# Patient Record
Sex: FEMALE | Race: WHITE | NOT HISPANIC OR LATINO | Employment: STUDENT | URBAN - METROPOLITAN AREA
[De-identification: names, ages, dates, MRNs, and addresses within clinical notes are randomized per-mention and may not be internally consistent; named-entity substitution may affect disease eponyms.]

---
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
## 2021-10-29 ENCOUNTER — OFFICE VISIT (OUTPATIENT)
Dept: URGENT CARE | Facility: CLINIC | Age: 21
End: 2021-10-29
Payer: COMMERCIAL

## 2021-10-29 VITALS — TEMPERATURE: 98.8 F | HEART RATE: 110 BPM | OXYGEN SATURATION: 98 % | RESPIRATION RATE: 16 BRPM

## 2021-10-29 DIAGNOSIS — J02.0 STREP PHARYNGITIS: Primary | ICD-10-CM

## 2021-10-29 LAB — S PYO AG THROAT QL: NEGATIVE

## 2021-10-29 PROCEDURE — G0382 LEV 3 HOSP TYPE B ED VISIT: HCPCS | Performed by: PHYSICIAN ASSISTANT

## 2021-10-29 PROCEDURE — 87880 STREP A ASSAY W/OPTIC: CPT | Performed by: PHYSICIAN ASSISTANT

## 2021-10-29 PROCEDURE — U0003 INFECTIOUS AGENT DETECTION BY NUCLEIC ACID (DNA OR RNA); SEVERE ACUTE RESPIRATORY SYNDROME CORONAVIRUS 2 (SARS-COV-2) (CORONAVIRUS DISEASE [COVID-19]), AMPLIFIED PROBE TECHNIQUE, MAKING USE OF HIGH THROUGHPUT TECHNOLOGIES AS DESCRIBED BY CMS-2020-01-R: HCPCS | Performed by: PHYSICIAN ASSISTANT

## 2021-10-29 PROCEDURE — U0005 INFEC AGEN DETEC AMPLI PROBE: HCPCS | Performed by: PHYSICIAN ASSISTANT

## 2021-10-29 PROCEDURE — 99283 EMERGENCY DEPT VISIT LOW MDM: CPT | Performed by: PHYSICIAN ASSISTANT

## 2021-10-29 RX ORDER — AZITHROMYCIN 250 MG/1
TABLET, FILM COATED ORAL
Qty: 6 TABLET | Refills: 0 | Status: SHIPPED | OUTPATIENT
Start: 2021-10-29 | End: 2021-11-02

## 2021-10-30 LAB — SARS-COV-2 RNA RESP QL NAA+PROBE: NEGATIVE

## 2021-11-03 LAB — B-HEM STREP SPEC QL CULT: NEGATIVE

## 2022-08-18 ENCOUNTER — OFFICE VISIT (OUTPATIENT)
Dept: URGENT CARE | Facility: CLINIC | Age: 22
End: 2022-08-18
Payer: COMMERCIAL

## 2022-08-18 VITALS — TEMPERATURE: 99.7 F | OXYGEN SATURATION: 99 % | RESPIRATION RATE: 18 BRPM | HEART RATE: 84 BPM

## 2022-08-18 DIAGNOSIS — N30.00 ACUTE CYSTITIS WITHOUT HEMATURIA: Primary | ICD-10-CM

## 2022-08-18 DIAGNOSIS — R30.0 DYSURIA: ICD-10-CM

## 2022-08-18 LAB
SL AMB  POCT GLUCOSE, UA: NEGATIVE
SL AMB LEUKOCYTE ESTERASE,UA: ABNORMAL
SL AMB POCT BILIRUBIN,UA: NEGATIVE
SL AMB POCT BLOOD,UA: NEGATIVE
SL AMB POCT CLARITY,UA: CLEAR
SL AMB POCT COLOR,UA: YELLOW
SL AMB POCT KETONES,UA: NEGATIVE
SL AMB POCT NITRITE,UA: NEGATIVE
SL AMB POCT PH,UA: 5
SL AMB POCT SPECIFIC GRAVITY,UA: 1.01
SL AMB POCT URINE PROTEIN: NEGATIVE
SL AMB POCT UROBILINOGEN: 0.2

## 2022-08-18 PROCEDURE — 87086 URINE CULTURE/COLONY COUNT: CPT | Performed by: PHYSICIAN ASSISTANT

## 2022-08-18 PROCEDURE — G0382 LEV 3 HOSP TYPE B ED VISIT: HCPCS | Performed by: PHYSICIAN ASSISTANT

## 2022-08-18 RX ORDER — CEPHALEXIN 500 MG/1
500 CAPSULE ORAL EVERY 12 HOURS SCHEDULED
Qty: 14 CAPSULE | Refills: 0 | Status: SHIPPED | OUTPATIENT
Start: 2022-08-18 | End: 2022-08-25

## 2022-08-18 NOTE — PATIENT INSTRUCTIONS
Urinary Tract Infection in Women   WHAT YOU NEED TO KNOW:   A urinary tract infection (UTI) is caused by bacteria that get inside your urinary tract  Most bacteria that enter your urinary tract come out when you urinate  If the bacteria stay in your urinary tract, you may get an infection  Your urinary tract includes your kidneys, ureters, bladder, and urethra  Urine is made in your kidneys, and it flows from the ureters to the bladder  Urine leaves the bladder through the urethra  A UTI is more common in your lower urinary tract, which includes your bladder and urethra  DISCHARGE INSTRUCTIONS:   Return to the emergency department if:   You are urinating very little or not at all  You have a high fever with shaking chills  You have side or back pain that gets worse  Call your doctor if:   You have a fever  You do not feel better after 2 days of taking antibiotics  You are vomiting  You have questions or concerns about your condition or care  Medicines:   Antibiotics  help fight a bacterial infection  If you have UTIs often (called recurrent UTIs), you may be given antibiotics to take regularly  You will be given directions for when and how to use antibiotics  The goal is to prevent UTIs but not cause antibiotic resistance by using antibiotics too often  Medicines  may be given to decrease pain and burning when you urinate  They will also help decrease the feeling that you need to urinate often  These medicines will make your urine orange or red  Take your medicine as directed  Contact your healthcare provider if you think your medicine is not helping or if you have side effects  Tell him or her if you are allergic to any medicine  Keep a list of the medicines, vitamins, and herbs you take  Include the amounts, and when and why you take them  Bring the list or the pill bottles to follow-up visits  Carry your medicine list with you in case of an emergency      Follow up with your doctor as directed:  Write down your questions so you remember to ask them during your visits  Prevent another UTI:   Empty your bladder often  Urinate and empty your bladder as soon as you feel the need  Do not hold your urine for long periods of time  Wipe from front to back after you urinate or have a bowel movement  This will help prevent germs from getting into your urinary tract through your urethra  Drink liquids as directed  Ask how much liquid to drink each day and which liquids are best for you  You may need to drink more liquids than usual to help flush out the bacteria  Do not drink alcohol, caffeine, or citrus juices  These can irritate your bladder and increase your symptoms  Your healthcare provider may recommend cranberry juice to help prevent a UTI  Urinate after you have sex  This can help flush out bacteria passed during sex  Do not douche or use feminine deodorants  These can change the chemical balance in your vagina  Change sanitary pads or tampons often  This will help prevent germs from getting into your urinary tract  Talk to your healthcare provider about your birth control method  You may need to change your method if it is increasing your risk for UTIs  Wear cotton underwear and clothes that are loose  Tight pants and nylon underwear can trap moisture and cause bacteria to grow  Vaginal estrogen may be recommended  This medicine helps prevent UTIs in women who have gone through menopause or are in mario-menopause  Do pelvic muscle exercises often  Pelvic muscle exercises may help you start and stop urinating  Strong pelvic muscles may help you empty your bladder easier  Squeeze these muscles tightly for 5 seconds like you are trying to hold back urine  Then relax for 5 seconds  Gradually work up to squeezing for 10 seconds  Do 3 sets of 15 repetitions a day, or as directed      © Copyright [employer] 2022 Information is for End User's use only and may not be sold, redistributed or otherwise used for commercial purposes  All illustrations and images included in CareNotes® are the copyrighted property of A D A M , Inc  or Anabell Pavon  The above information is an  only  It is not intended as medical advice for individual conditions or treatments  Talk to your doctor, nurse or pharmacist before following any medical regimen to see if it is safe and effective for you

## 2022-08-20 LAB — BACTERIA UR CULT: NORMAL

## 2022-08-29 NOTE — PROGRESS NOTES
[facility]        NAME: Meng Osorio is a 22 y o  female  : 2000    MRN: [id number]  DATE: 2022  TIME: 6:47 AM    Assessment and Plan   Acute cystitis without hematuria [N30 00]  1  Acute cystitis without hematuria  cephalexin (KEFLEX) 500 mg capsule   2  Dysuria  POCT urine dip auto non-scope    Urine culture         Patient Instructions       Follow up with PCP in 3-5 days  Proceed to  ER if symptoms worsen  Chief Complaint     Chief Complaint   Patient presents with    Possible UTI     Began yesterday         History of Present Illness       81-year-old female presents the clinic with dysuria, frequency, urgency that started yesterday  Patient denies fevers, chills, nausea, vomiting, headaches, dizziness, lightheadedness, flank pain, back pain, hematuria  Review of Systems   Review of Systems   Constitutional: Negative for chills and fever  Respiratory: Negative for shortness of breath  Cardiovascular: Negative for chest pain and palpitations  Gastrointestinal: Negative for abdominal pain, constipation, diarrhea, nausea and vomiting  Genitourinary: Positive for dysuria, frequency and urgency  Negative for flank pain and hematuria  Musculoskeletal: Negative for back pain  Neurological: Negative for dizziness, syncope, weakness, light-headedness and headaches  All other systems reviewed and are negative  Current Medications     No current outpatient medications on file  Current Allergies     Allergies as of 2022 - Reviewed 2022   Allergen Reaction Noted    Prednisone Chest Pain 10/29/2021            The following portions of the patient's history were reviewed and updated as appropriate: allergies, current medications, past family history, past medical history, past social history, past surgical history and problem list      Past Medical History:   Diagnosis Date    Strep throat        History reviewed   No pertinent surgical history  History reviewed  No pertinent family history  Medications have been verified  Objective   Pulse 84   Temp 99 7 °F (37 6 °C)   Resp 18   SpO2 99%   No LMP recorded  Physical Exam     Physical Exam  Vitals and nursing note reviewed  Constitutional:       General: She is not in acute distress  Appearance: She is well-developed  She is not diaphoretic  Pulmonary:      Effort: Pulmonary effort is normal    Abdominal:      General: Bowel sounds are normal       Palpations: Abdomen is soft  Tenderness: There is no abdominal tenderness  There is no right CVA tenderness, left CVA tenderness or guarding  Skin:     General: Skin is warm and dry  Neurological:      Mental Status: She is alert and oriented to person, place, and time

## 2022-10-12 ENCOUNTER — OFFICE VISIT (OUTPATIENT)
Dept: URGENT CARE | Facility: CLINIC | Age: 22
End: 2022-10-12
Payer: COMMERCIAL

## 2022-10-12 VITALS
HEIGHT: 62 IN | BODY MASS INDEX: 20.24 KG/M2 | RESPIRATION RATE: 18 BRPM | DIASTOLIC BLOOD PRESSURE: 62 MMHG | TEMPERATURE: 99.5 F | HEART RATE: 81 BPM | WEIGHT: 110 LBS | OXYGEN SATURATION: 100 % | SYSTOLIC BLOOD PRESSURE: 110 MMHG

## 2022-10-12 DIAGNOSIS — H69.93 DISORDER OF BOTH EUSTACHIAN TUBES: Primary | ICD-10-CM

## 2022-10-12 PROCEDURE — G0382 LEV 3 HOSP TYPE B ED VISIT: HCPCS | Performed by: PHYSICIAN ASSISTANT

## 2022-10-12 RX ORDER — FLUTICASONE PROPIONATE 50 MCG
1 SPRAY, SUSPENSION (ML) NASAL DAILY
Qty: 9.9 ML | Refills: 0 | Status: SHIPPED | OUTPATIENT
Start: 2022-10-12

## 2022-10-12 NOTE — PATIENT INSTRUCTIONS
Eustachian Tube Dysfunction   AMBULATORY CARE:   Eustachian tube dysfunction (ETD)  is a condition that prevents your eustachian tubes from opening properly  It can also cause them to become blocked  Eustachian tubes connect your middle ear to the back of your nose and throat  These tubes open and allow air to flow in and out when you sneeze, swallow, or yawn  Common signs and symptoms include the following:   Fullness or pressure in your ears    Muffled hearing, or a feeling you are hearing under water or have clogged ears    Pain in one or both ears    Ringing in your ears    Popping, crackling, or clicking feeling in your ears    Trouble keeping your balance    Call your doctor or otolaryngologist if:   Your symptoms do not improve or get worse  You have a fever  You have any hearing loss  You have questions or concerns about your condition or care  Treatment:  ETD may get better on its own without any treatment  If it continues, you may need any of the following:  Swallow, yawn, or chew gum  to help open your eustachian tubes  Your healthcare provider may also recommend you blow with your mouth shut and your nostrils pinched closed  Air pressure devices  push air into your nose and eustachian tubes to help relieve air pressure in your ear  Treatment for allergies  such as decongestants, antihistamines, and nasal steroids may improve ETD  They may help decrease swelling of the eustachian tubes  A myringotomy  is surgery to make a hole in your eardrum  The hole relieves pressure and lets fluid drain from your ear  A pressure equalizing (PE) tube may be used to keep the hole open and to help drain fluid  Tuboplasty  is a procedure to widen your eustachian tubes  Follow up with your doctor or otolaryngologist as directed:  Write down your questions so you remember to ask them during your visits    © Copyright [employer] 2022 Information is for End User's use only and may not be sold, redistributed or otherwise used for commercial purposes  All illustrations and images included in CareNotes® are the copyrighted property of [employer] M [employer]  or [employer]ab[employer] Pav[employer]  The above information is an  only  It is not intended as medical advice for individual conditions or treatments  Talk to your doctor, nurse or pharmacist before following any medical regimen to see if it is safe and effective for you

## 2022-11-01 ENCOUNTER — TELEPHONE (OUTPATIENT)
Dept: PSYCHIATRY | Facility: CLINIC | Age: 22
End: 2022-11-01

## 2022-11-01 NOTE — TELEPHONE ENCOUNTER
Clt is calling for counseling, looking for virtual apts  Clt would like to be seen due to life stressors and anxiety issues

## 2022-11-03 ENCOUNTER — TELEMEDICINE (OUTPATIENT)
Dept: BEHAVIORAL/MENTAL HEALTH CLINIC | Facility: CLINIC | Age: 22
End: 2022-11-03

## 2022-11-03 DIAGNOSIS — F41.9 ANXIETY DISORDER, UNSPECIFIED TYPE: Primary | ICD-10-CM

## 2022-11-03 NOTE — PSYCH
Psychotherapy Provided: Individual Psychotherapy 45 minutes     Length of time in session: 45 minutes, follow up in two weeks    Encounter Diagnosis     ICD-10-CM    1  Anxiety disorder, unspecified type  F41 9        Goals addressed in session: Goal 1     Pain:      moderate to severe    6    Current suicide risk : Low     Data: 1st session with Jaswinder Nguyen (22) who shared about history of anxiety  She works two jobs and only gets 4-6 hours sleep  Therapist emphasized 8-10 hours sleep and taught sleep skills  Psycho-education on anxiety physical symptoms  Reduce unreasonable worry and rumination  Client already does mindfulness activity ex  hiking, shower before bedtime; and therapist explained the concept of mindfulness to lower anxiety  Assessment: No interest in psych meds  No risks assessed  Mild to moderate anxiety  Poor sleep, and sleep improvement may significantly reduce the anxiety symptoms  Good engagement in session  Progress: Treatment plan goals of sleep and anxiety  Action phase  Plan: Homework of client following therapist suggestions  Client will call for next appt  Behavioral Health Treatment Plan [facility]: Diagnosis and Treatment Plan explained to [facility] relates understanding diagnosis and is agreeable to Treatment Plan   Yes     Visit start and stop times:    11/03/22  Start Time: 1255  Stop Time: 1340  Total Visit Time: 45 minutes

## 2022-11-03 NOTE — BH TREATMENT PLAN
Merry Loredo  2000       Date of Initial Treatment Plan: 11/3/22   Date of Current Treatment Plan: 11/03/22    Treatment Plan Number one     Strengths/Personal Resources for Self Care: Desire to get better    Diagnosis:   1  Anxiety disorder, unspecified type         Area of Needs: Improvement in sleep      Long Term Goal 1: A - Reduce anxiety    Target Date: N/A  Completion Date: N/A         Short Term Objectives for Goal 1: A - Use sleep skills to achieve 8-10 hours sleep            B: Practice anxiety skills      GOAL 1: Modality: Individual 2x per month   Completion Date TBD      Behavioral Health Treatment Plan [facility]: Diagnosis and Treatment Plan explained to Halley Scott relates understanding diagnosis and is agreeable to Treatment Plan            Client Comments : Please share your thoughts, feelings, need and/or experiences regarding your treatment plan:

## 2022-11-10 ENCOUNTER — APPOINTMENT (OUTPATIENT)
Dept: RADIOLOGY | Facility: CLINIC | Age: 22
End: 2022-11-10

## 2022-11-10 ENCOUNTER — OFFICE VISIT (OUTPATIENT)
Dept: URGENT CARE | Facility: CLINIC | Age: 22
End: 2022-11-10

## 2022-11-10 VITALS
DIASTOLIC BLOOD PRESSURE: 62 MMHG | OXYGEN SATURATION: 100 % | RESPIRATION RATE: 18 BRPM | TEMPERATURE: 98 F | HEART RATE: 78 BPM | SYSTOLIC BLOOD PRESSURE: 108 MMHG

## 2022-11-10 DIAGNOSIS — R07.89 CHEST WALL PAIN: Primary | ICD-10-CM

## 2022-11-10 DIAGNOSIS — R07.9 CHEST PAIN, UNSPECIFIED TYPE: ICD-10-CM

## 2022-11-10 NOTE — PATIENT INSTRUCTIONS
Chest Wall Pain   WHAT YOU NEED TO KNOW:   Chest wall pain may be caused by problems with the muscles, cartilage, or bones of the chest wall  Chest wall pain may also be caused by pain that spreads to your chest from another part of your body  The pain may be aching, severe, dull, or sharp  It may come and go, or it may be constant  The pain may be worse when you move in certain ways, breathe deeply, or cough  DISCHARGE INSTRUCTIONS:   Call 911 if:   You have any of the following signs of a heart attack:      Squeezing, pressure, or pain in your chest    You may  also have any of the following:     Discomfort or pain in your back, neck, jaw, stomach, or arm    Shortness of breath    Nausea or vomiting    Lightheadedness or a sudden cold sweat      Return to the emergency department if:   You have severe pain  Contact your healthcare provider if:   You develop a rash  You have other new symptoms  Your pain does not improve, even with treatment  You have questions or concerns about your condition or care  Medicines: You may need any of the following:  NSAIDs , such as ibuprofen, help decrease swelling, pain, and fever  This medicine is available with or without a doctor's order  NSAIDs can cause stomach bleeding or kidney problems in certain people  If you take blood thinner medicine, always ask your healthcare provider if NSAIDs are safe for you  Always read the medicine label and follow directions  Acetaminophen  decreases pain  It is available without a doctor's order  Ask how much to take and how often to take it  Follow directions  Acetaminophen can cause liver damage if not taken correctly  A cream  may be applied to your chest to decrease pain  Take your medicine as directed  Contact your healthcare provider if you think your medicine is not helping or if you have side effects  Tell him of her if you are allergic to any medicine   Keep a list of the medicines, vitamins, and herbs you take  Include the amounts, and when and why you take them  Bring the list or the pill bottles to follow-up visits  Carry your medicine list with you in case of an emergency  Follow up with your healthcare provider as directed:  Write down your questions so you remember to ask them during your visits  Self-care:   Rest  as needed  Avoid activities that make your chest wall pain worse  Apply heat  on your chest for 20 to 30 minutes every 2 hours for as many days as directed  Heat helps decrease pain and muscle spasms  Apply ice  on your chest for 15 to 20 minutes every hour or as directed  Use an ice pack, or put crushed ice in a plastic bag  Cover it with a towel  Ice helps prevent tissue damage and decreases swelling and pain  © Copyright [employer] 2022 Information is for End User's use only and may not be sold, redistributed or otherwise used for commercial purposes  All illustrations and images included in CareNotes® are the copyrighted property of A D A M , Inc  or [facility]   The above information is an  only  It is not intended as medical advice for individual conditions or treatments  Talk to your doctor, nurse or pharmacist before following any medical regimen to see if it is safe and effective for you

## 2022-11-11 LAB
ATRIAL RATE: 74 BPM
P AXIS: 27 DEGREES
PR INTERVAL: 116 MS
QRS AXIS: 28 DEGREES
QRSD INTERVAL: 72 MS
QT INTERVAL: 354 MS
QTC INTERVAL: 392 MS
T WAVE AXIS: 17 DEGREES
VENTRICULAR RATE: 74 BPM

## 2022-11-16 NOTE — PROGRESS NOTES
[facility]        NAME: Rigo Palacios is a 25 y o  female  : 2000    MRN: [id number]  DATE: November 10, 2022  TIME: 8:29 AM    Assessment and Plan   Chest wall pain [R07 89]  1  Chest wall pain  XR chest pa & lateral            Patient Instructions     Pt has what I suspect is chest wall pain as it is reproducible on exam and both EKG and CXR performed today are normal  Pt was provided reassurance  I rec conservative treatment trial including NSAIDs with close observation  Should be reevaluated if condition persists/worsens  Follow up with PCP in 3-5 days  Proceed to  ER if symptoms worsen  Chief Complaint     Chief Complaint   Patient presents with   • Pain     Developed today  She reports a heavy pain in the center of her chest  Constant pain that is made worse with deep breathing  History of Present Illness       Pt presents with onset today of chest pain  Reports worse with deep breathing and it is reproducible/tender to the touch  She reports it is constant  It does not radiate and she denies any other significant symptoms  Denies anxiety or any increase in stress  Denies injury/trauma to the chest        Review of Systems   Review of Systems   Constitutional: Negative  Respiratory: Negative  Cardiovascular: Positive for chest pain  Negative for palpitations  Gastrointestinal: Negative  Genitourinary: Negative            Current Medications       Current Outpatient Medications:   •  fluticasone (FLONASE) 50 mcg/act nasal spray, 1 spray into each nostril daily (Patient not taking: Reported on 11/10/2022), Disp: 9 9 mL, Rfl: 0    Current Allergies     Allergies as of 11/10/2022 - Reviewed 11/10/2022   Allergen Reaction Noted   • Prednisone Chest Pain 10/29/2021            The following portions of the patient's history were reviewed and updated as appropriate: allergies, current medications, past family history, past medical history, past social history, past surgical history and problem list      Past Medical History:   Diagnosis Date   • Strep throat        History reviewed  No pertinent surgical history  History reviewed  No pertinent family history  Medications have been verified  Objective   /62   Pulse 78   Temp 98 °F (36 7 °C)   Resp 18   SpO2 100%   No LMP recorded  Physical Exam     Physical Exam  Vitals reviewed  Constitutional:       General: She is not in acute distress  Appearance: She is well-developed and well-nourished  HENT:      Mouth/Throat:      Mouth: Mucous membranes are moist       Pharynx: Oropharynx is clear  Cardiovascular:      Rate and Rhythm: Normal rate and regular rhythm  Pulses: Normal pulses  Heart sounds: Normal heart sounds  No murmur heard  Pulmonary:      Effort: Pulmonary effort is normal  No respiratory distress  Breath sounds: Normal breath sounds  Musculoskeletal:         General: Tenderness (Reproducible TTP over the anterior chest but no swelling, ecchymosis, or deformity) present  No deformity  Cervical back: Neck supple  Right lower leg: No edema  Left lower leg: No edema  Lymphadenopathy:      Cervical: No cervical adenopathy  Neurological:      Mental Status: She is alert and oriented to person, place, and time     Psychiatric:         Mood and Affect: Mood and affect normal

## 2022-11-21 NOTE — PROGRESS NOTES
[facility]        NAME: Yvon Martinez is a 25 y o  female  : 2000    MRN: [id number]  DATE: 2022  TIME: 6:55 AM    Assessment and Plan   Disorder of both eustachian tubes [H69 93]  1  Disorder of both eustachian tubes  fluticasone (FLONASE) 50 mcg/act nasal spray            Patient Instructions       Follow up with PCP in 3-5 days  Proceed to  ER if symptoms worsen  Chief Complaint     Chief Complaint   Patient presents with   • Earache     Pt reports b/l ear pain that began last night  R>L  History of Present Illness       Pt reports b/l ear pain that began last night  R>L  Review of Systems   Review of Systems   Constitutional: Negative for chills and fever  HENT: Positive for ear pain  Negative for congestion, rhinorrhea and sore throat  Respiratory: Negative for cough  Gastrointestinal: Negative for abdominal pain, diarrhea, nausea and vomiting  Musculoskeletal: Negative for myalgias  Neurological: Negative for dizziness, light-headedness and headaches  Current Medications       Current Outpatient Medications:   •  fluticasone (FLONASE) 50 mcg/act nasal spray, 1 spray into each nostril daily (Patient not taking: Reported on 11/10/2022), Disp: 9 9 mL, Rfl: 0    Current Allergies     Allergies as of 10/12/2022 - Reviewed 10/12/2022   Allergen Reaction Noted   • Prednisone Chest Pain 10/29/2021            The following portions of the patient's history were reviewed and updated as appropriate: allergies, current medications, past family history, past medical history, past social history, past surgical history and problem list      Past Medical History:   Diagnosis Date   • Strep throat        History reviewed  No pertinent surgical history  History reviewed  No pertinent family history  Medications have been verified          Objective   /62   Pulse 81   Temp 99 5 °F (37 5 °C)   Resp 18   Ht 5' 2" (1 575 m)   Wt 49 9 kg (110 lb)   LMP 09/21/2022   SpO2 100%   BMI 20 12 kg/m²   Patient's last menstrual period was 09/21/2022  Physical Exam     Physical Exam  Vitals and nursing note reviewed  Constitutional:       General: She is not in acute distress  Appearance: She is well-developed  She is not diaphoretic  HENT:      Head: Normocephalic and atraumatic  Right Ear: A middle ear effusion is present  Tympanic membrane is not erythematous  Left Ear: A middle ear effusion is present  Tympanic membrane is not erythematous  Nose: Nose normal    Eyes:      Conjunctiva/sclera: Conjunctivae normal    Pulmonary:      Effort: Pulmonary effort is normal    Musculoskeletal:         General: Normal range of motion  Cervical back: Normal range of motion and neck supple  Skin:     General: Skin is warm and dry  Findings: No rash  Neurological:      Mental Status: She is alert and oriented to person, place, and time

## 2022-11-30 ENCOUNTER — TELEPHONE (OUTPATIENT)
Dept: PSYCHIATRY | Facility: CLINIC | Age: 22
End: 2022-11-30

## 2022-11-30 NOTE — TELEPHONE ENCOUNTER
Spoke to patient regarding her interest in [facility] from [facility] to a different therapist here  Explained to pt she would have to express her interest in transfer to [facility], having one more appt with him and then being transferred   Transferred call to  for scheduling

## 2022-12-13 ENCOUNTER — TELEMEDICINE (OUTPATIENT)
Dept: BEHAVIORAL/MENTAL HEALTH CLINIC | Facility: CLINIC | Age: 22
End: 2022-12-13

## 2022-12-13 ENCOUNTER — DOCUMENTATION (OUTPATIENT)
Dept: BEHAVIORAL/MENTAL HEALTH CLINIC | Facility: CLINIC | Age: 22
End: 2022-12-13

## 2022-12-13 DIAGNOSIS — F41.9 ANXIETY DISORDER, UNSPECIFIED TYPE: Primary | ICD-10-CM

## 2022-12-13 NOTE — PROGRESS NOTES
8/9/22 intake, sessions 11/3 and 12/13  Anxiety unspecified    Good engagement in therapist's recommendations  Client wants to try different therapists within the agency to get different perspectives  A friend recommended this  Does not need higher level of care  Transfer initiated by current therapist  No other providers  Routed to Mat Ramirez & Pito Rodriguez

## 2022-12-13 NOTE — PSYCH
Psychotherapy Provided: Individual Psychotherapy 30 minutes     Length of time in session: 30 minutes, follow up with new therapist    Encounter Diagnosis     ICD-10-CM    1  Anxiety disorder, unspecified type  F41 9           Goals addressed in session: Goal 1     Pain:      moderate to severe    2    Current suicide risk : Low     Data: Dian (22) reports using mindfulness (reading good books) effectively and has seen a decrease in anxiety  Reports still needing to improve sleep but uses books at bedtime to clear her mind  Discussed are 22 adjustment and transition  Reports no barriers to pursuing new dreams and ambitions  Assessment: No psych meds and does not want medication  No risks assessed  Adjustment anxiety  Good engagement in session  5-7 hours sleep  Daniel Arjun wants to try different therapists to get different perspectives  No complaints about current therapist    Progress: Good engagement in skills to lower anxiety  Action phase  Plan: Per client's request a transfer document will be done  Behavioral Health Treatment Plan [facility]: Diagnosis and Treatment Plan explained to Harrold Ahumada relates understanding diagnosis and is agreeable to Treatment Plan   Yes     Visit start and stop times:    12/13/22  Start Time: 1300  Stop Time: 1330  Total Visit Time: 30 minutes

## 2022-12-15 ENCOUNTER — TELEPHONE (OUTPATIENT)
Dept: PSYCHIATRY | Facility: CLINIC | Age: 22
End: 2022-12-15

## 2022-12-15 NOTE — TELEPHONE ENCOUNTER
rec'd [employer] for transfer of care from [facility] to another provider  LVM for pt to contact intake for scheduling

## 2022-12-28 NOTE — TELEPHONE ENCOUNTER
Pt has been scheduled for transfer of care appt from [facility] to [facility] on 2/9 at 12pm VIRTUAL  Link to be sent to cell

## 2023-02-09 ENCOUNTER — TELEMEDICINE (OUTPATIENT)
Dept: BEHAVIORAL/MENTAL HEALTH CLINIC | Facility: CLINIC | Age: 23
End: 2023-02-09

## 2023-02-09 ENCOUNTER — TELEPHONE (OUTPATIENT)
Dept: PSYCHIATRY | Facility: CLINIC | Age: 23
End: 2023-02-09

## 2023-02-09 DIAGNOSIS — F41.9 ANXIETY DISORDER, UNSPECIFIED TYPE: Primary | ICD-10-CM

## 2023-02-09 NOTE — PSYCH
Virtual Regular Visit    Verification of patient location:    Patient is located in the following state in which I hold an active license [place]      Assessment/Plan:    Problem List Items Addressed This Visit    None      Goals addressed in session: Goal 1 and Goal 2          Reason for visit is   Chief Complaint   Patient presents with   • Virtual Regular Visit        Encounter provider Gilda Cloud, [facility] AND WOMEN'S [facility]    Provider located at [facility] [address] [id number] [facility]  [phone]      Recent Visits  No visits were found meeting these conditions  Showing recent visits within past 7 days and meeting all other requirements  Future Appointments  No visits were found meeting these conditions  Showing future appointments within next 150 days and meeting all other requirements       The patient was identified by name and date of birth  Lynsey Beverly was informed that this is a telemedicine visit and that the visit is being conducted throughthe [employer] platform  She agrees to proceed     My office door was closed  No one else was in the room  She acknowledged consent and understanding of privacy and security of the video platform  The patient has agreed to participate and understands they can discontinue the visit at any time  Patient is aware this is a billable service  Arin Antony is a 25 y o  female presenting for counseling as a transfer from [facility] Psychotherapy Progress Note    Psychotherapy Provided: Individual Psychotherapy     No diagnosis found  Goals addressed in session: Goal 1     DATA:     Transfer from [facility]  Ct  Would like to address anxiety and relationship issues  Reviewed chart and client's history, current status  Ct reports that her mom and dad  when client was in 7th grade  "Mom moved out and got a place  50/50 visitation  Was harsh at times, but not horrible  Divorce in process, may be complete  "Shannan Solis out of love with one another, and had mistrust  Was difficult "  Isiah Lim reports that the kids of that union are client and twin sister Sarah Acuña - Fraternal   Close with her  Father had three children in previous relationship  Lived with two of them  Sugar 30, Donta 27 or 28  Last three years of rx of parents was not as healthy  Father worked a lot, and was not as close with him  Tiptoed around him  Was closer with Mom, now closer with F   F more of a people pleaser  Ct reports on her relationship history: "12th grade, dating Bina Coelho  He is not around anymore  Best friend Anne Le and I friends 10 years  After Bina Coelho and clt broke up, decided to try relationship romantically  Got together awhile, and then broke up in the summer  Took a break to better ourselves and focus on our respective career goals for a bit  Still friends, talk a lot  Started dating again recently  In the meanwhile, re-met another childhood friend, and started rx with [facility]  Took time, but started to date  Like both of them  Feeling some confusion  Hx of close rx's that others would percieve as a romantic relationship  Girl best friend is Analy  I have trouble understanding myself, and put others before me  Mostly confused about what I am feeling "    " want to understand my feelings and how to sort myself out  My primary focus right now is on relationships issues and managing how to be healthy  I identify as female and I prefer men "    "I work as a  with 32 year olds  I am also a manager at [employer]  I moved out on my won about a year ago    My father moved to [place] "    During this session, this clinician used the following therapeutic modalities: Cognitive Behavioral Therapy, Dialectical Behavior Therapy, Mindfulness-based Strategies, Motivational Interviewing and Supportive Psychotherapy     Client said she is comfortable with this therapist so far and would like to RS  Substance Abuse was addressed during this session  If the client is diagnosed with a co-occurring substance use disorder, please indicate any changes in the frequency or amount of use: Ct  Denies any problems with drugs or alcohol  Has an occasional drink, denies street drug use    Stage of change for addressing substance use diagnoses: No substance use/Not applicable  Never smoker  ASSESSMENT:  Espinoza Murguia presents with a Euthymic/ normal mood  her affect is Normal range and intensity, which is congruent, with her mood and the content of the session  The client has made progress on her goals  Honey Mario is not on meds and would prefer to work on things behaviorally  If that does not work will revisit need for meds  "  Espinoza Murguia presents with a none risk of suicide, none risk of self-harm, and none risk of harm to others  For any risk assessment that surpasses a "low" rating, a safety plan must be developed  A safety plan was indicated: no  If yes, describe in detail      PLAN: Between sessions, Espinoza Hsantal will work on above discussed topics and tx goals    At the next session, the therapist will use Cognitive Behavioral Therapy, Dialectical Behavior Therapy, Mindfulness-based Strategies, Motivational Interviewing and Supportive Psychotherapy to address same  RS for 3/9, client put on cancel list for sooner, invited to call if needs support before 3/9  Behavioral Health Treatment Plan and Discharge Planning: Espinozaaaliyah Murguia is aware of and agrees to continue to work on their treatment plan  They have identified and are working toward their discharge goals  yes    Visit start and stop times:    02/18/23  Start Time: 1208  Stop Time: 1300  Total Visit Time: 52 minutes      HPI     Past Medical History:   Diagnosis Date   • Strep throat        No past surgical history on file      Current Outpatient Medications   Medication Sig Dispense Refill   • fluticasone (FLONASE) 50 mcg/act nasal spray 1 spray into each nostril daily (Patient not taking: Reported on 11/10/2022) 9 9 mL 0     No current facility-administered medications for this visit  Allergies   Allergen Reactions   • Prednisone Chest Pain       Review of Systems    Video Exam    There were no vitals filed for this visit      Physical Exam

## 2023-02-18 PROBLEM — F41.9 ANXIETY DISORDER, UNSPECIFIED: Status: ACTIVE | Noted: 2023-02-18

## 2023-03-16 ENCOUNTER — TELEPHONE (OUTPATIENT)
Dept: BEHAVIORAL/MENTAL HEALTH CLINIC | Facility: CLINIC | Age: 23
End: 2023-03-16

## 2023-03-16 NOTE — TELEPHONE ENCOUNTER
Ct  feeling anxious  Sent email today to me reflecting same  I made an outreach call to client  left VM  Responded via email to her original email  "Tushar Biggs, I am sorry to hear you are feeling anxious  I tried to call  Can call me [phone] until 10 am and after 2 pm   We are encouraged not to email clinical information, so I would prefer to talk with you if possible  You could also respond via email and let me know times I can call you  Also, if want some anxiety mgmt  tips along lines of what we have been discussing until we talk, can watch “Therapy in [place] on Youtube  They are short and helpful  Talk with you soon, Giovani Aquino"

## 2023-03-20 ENCOUNTER — TELEMEDICINE (OUTPATIENT)
Dept: BEHAVIORAL/MENTAL HEALTH CLINIC | Facility: CLINIC | Age: 23
End: 2023-03-20

## 2023-03-20 DIAGNOSIS — F41.9 ANXIETY DISORDER, UNSPECIFIED TYPE: Primary | ICD-10-CM

## 2023-03-20 NOTE — PSYCH
Virtual Regular Visit    Verification of patient location:    Patient is located in the following state in which I hold an active license [place]      Assessment/Plan:    Problem List Items Addressed This Visit        Other    Anxiety disorder, unspecified - Primary       Goals addressed in session: Goal 1          Reason for visit is   Chief Complaint   Patient presents with   • Virtual Regular Visit        Encounter provider Ernesto Delgado, [facility] \[facility]\""    Provider located at [address] [place], [place] [id number]  [address] [place] [address]  [phone]      Recent Visits  Date Type Provider Dept   03/16/23 Telephone Ernesto Delgado, [address] [facility]ist [facility]   Showing recent visits within past 7 days and meeting all other requirements  Today's Visits  Date Type Provider Dept   03/20/23 [facility], [address] [facility] Therapist [facility]   Showing today's visits and meeting all other requirements  Future Appointments  No visits were found meeting these conditions  Showing future appointments within next 150 days and meeting all other requirements       The patient was identified by name and date of birth  Shashi Montana was informed that this is a telemedicine visit and that the visit is being conducted throughthe [employer] platform  She agrees to proceed     My office door was closed  No one else was in the room  She acknowledged consent and understanding of privacy and security of the video platform  The patient has agreed to participate and understands they can discontinue the visit at any time  Patient is aware this is a billable service  Eris Diallo is a 25 y o  female presenting for counseling support       Behavioral Health Psychotherapy Progress Note    Psychotherapy Provided: Individual Psychotherapy     1   Anxiety disorder, unspecified type            Goals addressed in session: Goal 1 DATA:     Client discussed healthy growth with Tyrone (24)  "He has been staying with me since Ayala's day  We are communicating well, have a lot in common, he is supportive of me "  Identified an area of challenge: "His mom is not especially happy with us being together  I think she is frustrated that she can't control him  She is unmarried, and does not have a boyfriend  He is wanting to be on his own and not reliant on her  He wants to be an independent adult  He is working as a  but is finding that he wants to do something else, so exploring job options "  We discussed what she wants to heal the rx with his mother and how close ore distant she wants the rx  Ct  Uses healthy calm assertive language  Continue to build coping skills  During this session, this clinician used the following therapeutic modalities: Cognitive Behavioral Therapy, Dialectical Behavior Therapy, Mindfulness-based Strategies, Motivational Interviewing and Supportive Psychotherapy    Substance Abuse was not addressed during this session  If the client is diagnosed with a co-occurring substance use disorder, please indicate any changes in the frequency or amount of use:   Stage of change for addressing substance use diagnoses: No substance use/Not applicable    ASSESSMENT:  Jolynn Patel presents with a Euthymic/ normal mood  her affect is Normal range and intensity, which is congruent, with her mood and the content of the session  The client has made progress on their goals  Jolynn Patel presents with a none risk of suicide, none risk of self-harm, and none risk of harm to others  For any risk assessment that surpasses a "low" rating, a safety plan must be developed  A safety plan was indicated: no  If yes, describe in detail     PLAN: Between sessions, Jolynn Patel will continue to work on self and building coping skills    Ct  Will also continue focus on tx goal    At the next session, the therapist will use Cognitive Behavioral Therapy, Dialectical Behavior Therapy, Mindfulness-based Strategies, Motivational Interviewing and Supportive Psychotherapy to address same     RS for 4/3  Behavioral Health Treatment Plan and Discharge Planning: Thong Foss is aware of and agrees to continue to work on their treatment plan  They have identified and are working toward their discharge goals  yes    Visit start and stop times:    03/20/23  Start Time: 1205  Stop Time: 1257  Total Visit Time: 52 minutes      HPI     Past Medical History:   Diagnosis Date   • Strep throat        No past surgical history on file  Current Outpatient Medications   Medication Sig Dispense Refill   • fluticasone (FLONASE) 50 mcg/act nasal spray 1 spray into each nostril daily (Patient not taking: Reported on 11/10/2022) 9 9 mL 0     No current facility-administered medications for this visit  Allergies   Allergen Reactions   • Prednisone Chest Pain       Review of Systems    Video Exam    There were no vitals filed for this visit      Physical Exam

## 2023-04-03 ENCOUNTER — TELEMEDICINE (OUTPATIENT)
Dept: BEHAVIORAL/MENTAL HEALTH CLINIC | Facility: CLINIC | Age: 23
End: 2023-04-03

## 2023-04-03 DIAGNOSIS — F41.9 ANXIETY DISORDER, UNSPECIFIED TYPE: Primary | ICD-10-CM

## 2023-04-03 NOTE — PSYCH
Virtual Regular Visit    Verification of patient location:    Patient is located in the following state in which I hold an active license [place]      Assessment/Plan:    Problem List Items Addressed This Visit        Other    Anxiety disorder, unspecified - Primary       Goals addressed in session: Goal 1          Reason for visit is   Chief Complaint   Patient presents with   • Virtual Regular Visit        Encounter provider Yesica Bailey, [facility]    Provider located at [address] [place] [address] [address] [place] [address]  [phone]      Recent Visits  No visits were found meeting these conditions  Showing recent visits within past 7 days and meeting all other requirements  Today's Visits  Date Type Provider Dept   04/03/23 1000 [facility], [facility] Therapist [facility]   Showing today's visits and meeting all other requirements  Future Appointments  No visits were found meeting these conditions  Showing future appointments within next 150 days and meeting all other requirements       The patient was identified by name and date of birth  Radhaa Book was informed that this is a telemedicine visit and that the visit is being conducted throughthe [employer] platform  She agrees to proceed     My office door was closed  No one else was in the room  She acknowledged consent and understanding of privacy and security of the video platform  The patient has agreed to participate and understands they can discontinue the visit at any time  Patient is aware this is a billable service  Brooklynn Ortega is a 25 y o  female presenting for counseling support  Behavioral Health Psychotherapy Progress Note    Psychotherapy Provided: Individual Psychotherapy     1   Anxiety disorder, unspecified type            Goals addressed in session: Goal 1     DATA:     Elan Barajas reports continued good progress in "the relationship with one another  Communication is good  Erma Anthony is using calm assertive language  Preparing for possible meeting with Se's mom as visit over Easter  Worked through more healthy communication skills, calm assertive language, reflective listening, and physically being in good place before going - good night's sleep, hydrated, and eat something before going to have good blood sugare equalling more resilience  Discussed where having some stress around going to work both feeling like they would prefer to stay home and be together  Working on finding one job that we each like  Tues and Thurs  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 at second job  He uses that time to get his things done  During this session, this clinician used the following therapeutic modalities: Cognitive Behavioral Therapy, Dialectical Behavior Therapy, Mindfulness-based Strategies, Motivational Interviewing and Supportive Psychotherapy    Substance Abuse was not addressed during this session  If the client is diagnosed with a co-occurring substance use disorder, please indicate any changes in the frequency or amount of use  Stage of change for addressing substance use diagnoses: No substance use/Not applicable    ASSESSMENT:  Moe Hanna presents with a Euthymic/ normal mood  her affect is Normal range and intensity, which is congruent, with her mood and the content of the session  The client has made progress on their goals  Moe Hanna presents with a none risk of suicide, none risk of self-harm, and none risk of harm to others  For any risk assessment that surpasses a \"low\" rating, a safety plan must be developed      A safety plan was indicated: no  If yes, describe in detail     PLAN: Between sessions, Moe Hanna will Continue to work on above issues and tx goal  At the next session, the therapist will use Cognitive Behavioral Therapy, Dialectical Behavior Therapy, Mindfulness-based Strategies, Motivational " Interviewing and Supportive Psychotherapy to address same  RS'd for 4/17/23  Behavioral Health Treatment Plan and Discharge Planning: Heidi Jacobsen is aware of and agrees to continue to work on their treatment plan  They have identified and are working toward their discharge goals  yes    Visit start and stop times:    04/03/23  Start Time: 1303  Stop Time: 1354  Total Visit Time: 51 minutes      HPI     Past Medical History:   Diagnosis Date   • Strep throat        No past surgical history on file  Current Outpatient Medications   Medication Sig Dispense Refill   • fluticasone (FLONASE) 50 mcg/act nasal spray 1 spray into each nostril daily (Patient not taking: Reported on 11/10/2022) 9 9 mL 0     No current facility-administered medications for this visit  Allergies   Allergen Reactions   • Prednisone Chest Pain       Review of Systems    Video Exam    There were no vitals filed for this visit      Physical Exam

## 2023-05-01 ENCOUNTER — TELEMEDICINE (OUTPATIENT)
Dept: BEHAVIORAL/MENTAL HEALTH CLINIC | Facility: CLINIC | Age: 23
End: 2023-05-01

## 2023-05-01 DIAGNOSIS — F41.9 ANXIETY DISORDER, UNSPECIFIED TYPE: Primary | ICD-10-CM

## 2023-05-01 NOTE — BH TREATMENT PLAN
Melissa Guzman  2000       Date of Initial Treatment Plan: 11/3/22   Date of Current Treatment Plan: 05/01/23    Treatment Plan Number one     Strengths/Personal Resources for Self Care: Desire to get better    Diagnosis:   No diagnosis found  Area of Needs: Improvement in sleep      Long Term Goal 1: A - Reduce anxiety    Target Date: 10/31/23  Completion Date: 10/31/23         Short Term Objectives for Goal 1: A - Use sleep skills to achieve 7 - 8 +  hours sleep and leave [employer] job, which impacts sleep due to working late  Progress noted  Manage stress assoc'd with leaving [employer] - asserting self/improved communication and manage ass'd money                                                            concerns  B: Practice anxiety management skills  Ground self, use supports, deescalate physical response, identify what                                                                    need and assert self in healthy way  GOAL 1: Modality: Individual 2x per month   Completion Date TBD      Behavioral Health Treatment Plan [place]: Diagnosis and Treatment Plan explained to Danya Sr relates understanding diagnosis and is agreeable to Treatment Plan  Client Comments : Dian happy with plan

## 2023-05-01 NOTE — PSYCH
Virtual Regular Visit    Verification of patient location:    Patient is located in the following state in which I hold an [place] license [place]      Assessment/Plan:    Problem List Items Addressed This Visit        Other    Anxiety disorder, unspecified - Primary       Goals addressed in session: Goal 1          Reason for visit is   No chief complaint on file  Encounter provider Glendy Simms, [facility] AND WOMEN'[facility]    Provider located at [address] [place] [address] [address]  [address] [place] [address]  [phone]      Recent Visits  No visits were found meeting these conditions  Showing recent visits within past 7 days and meeting all other requirements  Today's Visits  Date Type Provider Dept   05/01/23 Telemedicine Glendy Simms, [address] [facility] [place] [facility]   Showing today's visits and meeting all other requirements  Future Appointments  No visits were found meeting these conditions  Showing future appointments within next 150 days and meeting all other requirements       The patient was identified by name and date of birth  Urban Hunger was informed that this is a telemedicine visit and that the visit is being conducted through the [employer]  She agrees to proceed     My office door was closed  No one else was in the room  She acknowledged consent and understanding of privacy and security of the video platform  The patient has agreed to participate and understands they can discontinue the visit at any time  Patient is aware this is a billable service  Ines Ruiz is a 25 y o  female presenting for counseling support  Behavioral Health Psychotherapy Progress Note    Psychotherapy Provided: Individual Psychotherapy     1   Anxiety disorder, unspecified type            Goals addressed in session: Goal 1     DATA:     Rebeccameenu Lily discussed primary anxiety currently is that she is meeting with her "manager to discuss 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  \"My sleep is generally better on most nights 6-8 hours, but on [employer] nights, only usually get 5-6 hours  \"  We processed through her fears of discussing challenging topic with her boss  Practiced calm assertive communication  Ct  And I worked on her being clear about exactly what she wants, which day and when exiting  This is all contingent on her BF agreeing to pay more for bills  \"We discussed it over the weekend, and he did say he would, but also has been talking about hating his job  His plan is to find another job and then quit  \"    Completed treatment plan update  Client signed form via my chart during session  See form  Ct  Appreciated tx support  During this session, this clinician used the following therapeutic modalities: Cognitive Behavioral Therapy, Dialectical Behavior Therapy, Mindfulness-based Strategies, Motivational Interviewing and Supportive Psychotherapy    Substance Abuse was not addressed during this session  If the client is diagnosed with a co-occurring substance use disorder, please indicate any changes in the frequency or amount of use  Stage of change for addressing substance use diagnoses: No substance use/Not applicable    ASSESSMENT:  Chapis Jacques presents with a Euthymic/ normal mood and some stress/anxiety      her affect is Normal range and intensity, which is congruent, with her mood and the content of the session  The client has made progress on their goals  Chapis Jacques presents with a none risk of suicide, none risk of self-harm, and none risk of harm to others  For any risk assessment that surpasses a \"low\" rating, a safety plan must be developed      A safety plan was indicated: no  If yes, describe in detail     PLAN: Between sessions, Chapis Jacques will Continue to work on above issues and tx goal  At the next session, the therapist will use Cognitive " Behavioral Therapy, Dialectical Behavior Therapy, Mindfulness-based Strategies, Motivational Interviewing and Supportive Psychotherapy to address same  RS'd for 5/15       Behavioral Health Treatment Plan and Discharge Planning: Bernard Bajwa is aware of and agrees to continue to work on their treatment plan  They have identified and are working toward their discharge goals  yes    Visit start and stop times:    05/01/23  Start Time: 1308  Stop Time: 1359  Total Visit Time: 51 minutes    HPI     Past Medical History:   Diagnosis Date    Strep throat        No past surgical history on file  Current Outpatient Medications   Medication Sig Dispense Refill    fluticasone (FLONASE) 50 mcg/act nasal spray 1 spray into each nostril daily (Patient not taking: Reported on 11/10/2022) 9 9 mL 0     No current facility-administered medications for this visit  Allergies   Allergen Reactions    Prednisone Chest Pain       Review of Systems    Video Exam    There were no vitals filed for this visit      Physical Exam

## 2023-05-08 ENCOUNTER — OFFICE VISIT (OUTPATIENT)
Dept: URGENT CARE | Facility: CLINIC | Age: 23
End: 2023-05-08

## 2023-05-08 VITALS
TEMPERATURE: 98.6 F | HEIGHT: 62 IN | RESPIRATION RATE: 16 BRPM | DIASTOLIC BLOOD PRESSURE: 70 MMHG | BODY MASS INDEX: 20.24 KG/M2 | SYSTOLIC BLOOD PRESSURE: 130 MMHG | HEART RATE: 72 BPM | WEIGHT: 110 LBS | OXYGEN SATURATION: 99 %

## 2023-05-08 DIAGNOSIS — R11.2 NAUSEA AND VOMITING, UNSPECIFIED VOMITING TYPE: Primary | ICD-10-CM

## 2023-05-08 NOTE — LETTER
May 8, 2023     Patient: Sheron Damon   YOB: 2000   Date of Visit: 5/8/2023       To Whom It May Concern: It is my medical opinion that Annabel Mosquera may return to work on 5/9/23  If you have any questions or concerns, please don't hesitate to call           Sincerely,        Vicente Marquez PA-C    CC: No Recipients

## 2023-05-08 NOTE — PROGRESS NOTES
[address]        NAME: Trixie Sapp is a 25 y o  female  : 2000    MRN: [id number]  DATE: May 8, 2023  TIME: 10:58 AM    Assessment and Plan   Nausea and vomiting, unspecified vomiting type [R11 2]  1  Nausea and vomiting, unspecified vomiting type              Patient Instructions     Follow BRAT diet for the next day or so  Stay hydrated   Follow up with PCP in 3-5 days  Proceed to  ER if symptoms worsen  Chief Complaint     Chief Complaint   Patient presents with   • Vomiting     Pt reports onset of vomiting last night after drinking pineapple juice that subsided early this morning  Denies any symptoms this morning  History of Present Illness       Vomiting   This is a new problem  Episode onset: overnight after drinking pineapple juice  The problem has been resolved  The emesis has an appearance of stomach contents  There has been no fever  Pertinent negatives include no abdominal pain, arthralgias, chest pain, chills, coughing, diarrhea, dizziness or fever  Risk factors include suspect food intake  She has tried nothing for the symptoms  She states she stopped vomiting this morning and was able to keep down a piece of toast   She called off of work today and needs a note  Review of Systems   Review of Systems   Constitutional: Negative for chills and fever  HENT: Negative for ear pain and sore throat  Eyes: Negative for pain and visual disturbance  Respiratory: Negative for cough and shortness of breath  Cardiovascular: Negative for chest pain and palpitations  Gastrointestinal: Positive for vomiting  Negative for abdominal pain and diarrhea  Genitourinary: Negative for dysuria and hematuria  Musculoskeletal: Negative for arthralgias and back pain  Skin: Negative for color change and rash  Neurological: Negative for dizziness, seizures and syncope  All other systems reviewed and are negative          Current Medications       Current Outpatient "Medications:   •  fluticasone (FLONASE) 50 mcg/act nasal spray, 1 spray into each nostril daily (Patient not taking: Reported on 11/10/2022), Disp: 9 9 mL, Rfl: 0    Current Allergies     Allergies as of 05/08/2023 - Reviewed 05/08/2023   Allergen Reaction Noted   • Prednisone Chest Pain 10/29/2021            The following portions of the patient's history were reviewed and updated as appropriate: allergies, current medications, past family history, past medical history, past social history, past surgical history and problem list      Past Medical History:   Diagnosis Date   • Strep throat        No past surgical history on file  No family history on file  Medications have been verified  Objective   /70 (BP Location: Right arm, Patient Position: Sitting)   Pulse 72   Temp 98 6 °F (37 °C)   Resp 16   Ht 5' 2\" (1 575 m)   Wt 49 9 kg (110 lb)   SpO2 99%   BMI 20 12 kg/m²   No LMP recorded  Physical Exam     Physical Exam  Vitals and nursing note reviewed  Constitutional:       General: She is not in acute distress  Appearance: Normal appearance  HENT:      Head: Normocephalic and atraumatic  Cardiovascular:      Rate and Rhythm: Normal rate and regular rhythm  Pulses: Normal pulses  Heart sounds: Normal heart sounds  Pulmonary:      Effort: Pulmonary effort is normal       Breath sounds: Normal breath sounds  Abdominal:      General: Bowel sounds are normal  There is no distension  Palpations: There is no mass  Tenderness: There is no guarding  Comments: Mild, generalized abdominal tenderness, no focal tenderness    Skin:     General: Skin is warm and dry  Neurological:      Mental Status: She is alert and oriented to person, place, and time     Psychiatric:         Mood and Affect: Mood normal          Behavior: Behavior normal                    "

## 2023-05-08 NOTE — PATIENT INSTRUCTIONS
Follow BRAT diet for the next day or so  Stay hydrated   Follow up with PCP in 3-5 days  Proceed to  ER if symptoms worsen

## 2023-05-17 ENCOUNTER — TELEPHONE (OUTPATIENT)
Dept: BEHAVIORAL/MENTAL HEALTH CLINIC | Facility: CLINIC | Age: 23
End: 2023-05-17

## 2023-05-18 ENCOUNTER — TELEPHONE (OUTPATIENT)
Dept: PSYCHIATRY | Facility: CLINIC | Age: 23
End: 2023-05-18

## 2023-05-18 NOTE — TELEPHONE ENCOUNTER
Contacted patient to get 5/18/23 appt rescheduled due to Stephen Frost  out of the office  Asked Patient to call back to reschedule

## 2023-06-01 ENCOUNTER — TELEMEDICINE (OUTPATIENT)
Dept: BEHAVIORAL/MENTAL HEALTH CLINIC | Facility: CLINIC | Age: 23
End: 2023-06-01

## 2023-06-01 DIAGNOSIS — F41.9 ANXIETY DISORDER, UNSPECIFIED TYPE: Primary | ICD-10-CM

## 2023-06-01 NOTE — BH CRISIS PLAN
"Client Name: Rosy Martin       Client YOB: 2000  : 2000    Treatment Team (include name and contact information):     Psychotherapist: Shantell Luke LPC MA    Psychiatrist: None   Release of information completed: no     None   Release of information completed: no    Healthcare Provider  Annabel garcia DO  [facility] [address]  [place] [address]    Type of Plan   * Child plans (children 15 yo and younger) must be completed and signed by the child's legal guardian   * Plans for all individuals 15 yo and above must be signed by the client  Plan Type:  Initial      My Personal Strengths are (in the client's own words):  \"compassionate, try to understand other's positions, good at listening\", open to learning new things, motivated to be healthier, responsible, determined, hard working, loyal, and caring  The stressors and triggers that may put me at risk are:  other (describe) overthink a lot, get overwhelmed easily sometimes, hard to be put into situations that are unplanned, like to plan things out  Coping skills I can use to keep myself calm and safe: Take a shower, Listen to music, Physical activity and Call a friend or family member    Coping skills/supports I can use to maintain abstinence from substance use:   NA    The people that provide me with help and support: (Include name, contact, and how they can help)   Support person #1: Olga Charlton    * Phone number: [phone]    * How can they help me? Listen and give support       In the past, the following has helped me in times of crisis:    Calling a friend, Taking a walk or exercising, Listening to music and Other: take a shower      If it is an emergency and you need immediate help, call     If there is a possibility of danger to yourself or others, call the following crisis hotline resources:     Adult Crisis Numbers  Suicide Prevention Hotline - Dial   [facility]: " [address]: [address]: [address]: [phone]  [address] ([facility]): [phone]  [place]: [address]: [address] [place] [address] St: [phone] (daytime)  [phone] (after hours, weekends, holidays)     Child/Adolescent Crisis Numbers   [facility]: [address]: [phone]   [place]ita Dill[place]: [phone]   [address] ([facility]): [phone]    Please note: Some counties do not have a separate number for Child/Adolescent specific crisis  If your county is not listed under Child/Adolescent, please call the adult number for your county     National Talk to Text Line   [place] - [phone]    In the event your feelings become unmanageable, and you cannot reach your support system, you will call 911 immediately or go to the nearest hospital emergency room

## 2023-06-01 NOTE — PSYCH
Virtual Regular Visit    Verification of patient location:    Patient is located in the following state in which I hold an active license [place]      Assessment/Plan:    Problem List Items Addressed This Visit    None      Goals addressed in session: Goal 1          Reason for visit is   No chief complaint on file  Encounter provider Lisha Aquino, [facility]    Provider located at [address] [place] [address] [address]  [address] [place] [address]  [phone]      Recent Visits  No visits were found meeting these conditions  Showing recent visits within past 7 days and meeting all other requirements  Today's Visits  Date Type Provider Dept   06/01/23 Telemedicine Lisha Aquino, [facility] Therapist [facility]   Showing today's visits and meeting all other requirements  Future Appointments  No visits were found meeting these conditions  Showing future appointments within next 150 days and meeting all other requirements       The patient was identified by name and date of birth  Olivia Lawrence was informed that this is a telemedicine visit and that the visit is being conducted through the [employer]  She agrees to proceed     My office door was closed  No one else was in the room  She acknowledged consent and understanding of privacy and security of the video platform  The patient has agreed to participate and understands they can discontinue the visit at any time  Patient is aware this is a billable service  Subjective  Olivia Lawrence is a 25 y o  female presenting for counseling support  Behavioral Health Psychotherapy Progress Note    Psychotherapy Provided: Individual Psychotherapy     No diagnosis found  Goals addressed in session: Goal 1     DATA:     Dedrick Upton presents with fair mood and affect  She reports continued work on applying strategies learned in counseling    Stayed at [facility] "sitting  Now planning to go to meet with mother  Discussed associated anxiety and how to manage things  Revisited calm assertive communication techniques using adult adult language  Clt and I updated crisis plan, sent for her signature, but was approved virtually  Casey Shah continues to appreciate tx support  During this session, this clinician used the following therapeutic modalities: Cognitive Behavioral Therapy, Dialectical Behavior Therapy, Mindfulness-based Strategies, Motivational Interviewing and Supportive Psychotherapy    Substance Abuse was not addressed during this session  If the client is diagnosed with a co-occurring substance use disorder, please indicate any changes in the frequency or amount of use  Stage of change for addressing substance use diagnoses: No substance use/Not applicable    ASSESSMENT:  Blanca Lopez presents with a Euthymic/ normal mood and some stress/anxiety      her affect is Normal range and intensity, which is congruent, with her mood and the content of the session  The client has made progress on their goals  Blanca Lopez presents with a none risk of suicide, none risk of self-harm, and none risk of harm to others  For any risk assessment that surpasses a \"low\" rating, a safety plan must be developed  A safety plan was indicated: no  If yes, describe in detail     PLAN: Between sessions, Blanca Lopez will Continue to work on above issues and tx goal  At the next session, the therapist will use Cognitive Behavioral Therapy, Dialectical Behavior Therapy, Mindfulness-based Strategies, Motivational Interviewing and Supportive Psychotherapy to address same  RS'd for biweekly       Behavioral Health Treatment Plan and Discharge Planning: Blanca Lopez is aware of and agrees to continue to work on their treatment plan  They have identified and are working toward their discharge goals   yes    Visit start and stop times:    06/01/23  Start Time: 1304  Stop " Time: 1354  Total Visit Time: 50 minutes    HPI     Past Medical History:   Diagnosis Date   • Strep throat        No past surgical history on file  Current Outpatient Medications   Medication Sig Dispense Refill   • fluticasone (FLONASE) 50 mcg/act nasal spray 1 spray into each nostril daily (Patient not taking: Reported on 11/10/2022) 9 9 mL 0     No current facility-administered medications for this visit  Allergies   Allergen Reactions   • Prednisone Chest Pain       Review of Systems    Video Exam    There were no vitals filed for this visit      Physical Exam

## 2023-06-12 ENCOUNTER — TELEPHONE (OUTPATIENT)
Dept: BEHAVIORAL/MENTAL HEALTH CLINIC | Facility: CLINIC | Age: 23
End: 2023-06-12

## 2023-06-29 ENCOUNTER — TELEMEDICINE (OUTPATIENT)
Dept: BEHAVIORAL/MENTAL HEALTH CLINIC | Facility: CLINIC | Age: 23
End: 2023-06-29
Payer: COMMERCIAL

## 2023-06-29 DIAGNOSIS — F41.9 ANXIETY DISORDER, UNSPECIFIED TYPE: Primary | ICD-10-CM

## 2023-06-29 PROCEDURE — 90834 PSYTX W PT 45 MINUTES: CPT | Performed by: COUNSELOR

## 2023-06-29 NOTE — PSYCH
Virtual Regular Visit    Verification of patient location:    Patient is located in the following state in which I hold an active license [place]      Assessment/Plan:    Problem List Items Addressed This Visit        Other    Anxiety disorder, unspecified - Primary       Goals addressed in session: Goal 1          Reason for visit is   No chief complaint on file. Encounter provider Mehdi Hinojosa, [facility]    Provider located at [id number] [place], [place] [address]o  [address] [facility] [address] [place] [address]  [phone]      Recent Visits  Date Type Provider Dept   06/29/23 [address] [place] [address] Therapist [facility]   Showing recent visits within past 7 days and meeting all other requirements  Future Appointments  No visits were found meeting these conditions. Showing future appointments within next 150 days and meeting all other requirements       The patient was identified by name and date of birth. Singh Woodard was informed that this is a telemedicine visit and that the visit is being conducted through the [employer]. She agrees to proceed. .  My office door was closed. No one else was in the room. She acknowledged consent and understanding of privacy and security of the video platform. The patient has agreed to participate and understands they can discontinue the visit at any time. Patient is aware this is a billable service. Subjective  Singh Woodard is a 25 y.o. female presenting for counseling support. Behavioral Health Psychotherapy Progress Note    Psychotherapy Provided: Individual Psychotherapy     1. Anxiety disorder, unspecified type            Goals addressed in session: Goal 1     DATA:     Misty Paige discussed ongoing challenges with his mother and her dislike for Misty Appl. Misty Paige is getting tired of fighting against his mother's disdain. Misty Paige continues to appreciate tx support.     4 on PHQ-9 mild to no depression, and DANNY-7 scored 4 indicating minimal anxiety. Some tension between client and BF. Both are also stressed by work issues in addition to tensions between BF, ct and his mother. BF getting caught in the middle. Harman Goldmann noted some worrying, some tension, some sleep challenges (tossing and turning), some irritability. During this session, this clinician used the following therapeutic modalities: Cognitive Behavioral Therapy, Dialectical Behavior Therapy, Mindfulness-based Strategies, Motivational Interviewing and Supportive Psychotherapy    Substance Abuse was not addressed during this session. If the client is diagnosed with a co-occurring substance use disorder, please indicate any changes in the frequency or amount of use  Stage of change for addressing substance use diagnoses: No substance use/Not applicable    ASSESSMENT:  Nikki Oropeza presents with a Euthymic/ normal mood and some stress/anxiety. .   her affect is Normal range and intensity, which is congruent, with her mood and the content of the session. The client has made progress on their goals. Nikki Oropeza presents with a none risk of suicide, none risk of self-harm, and none risk of harm to others. For any risk assessment that surpasses a "low" rating, a safety plan must be developed. A safety plan was indicated: no  If yes, describe in detail     PLAN: Between sessions, Nikki Oropeza will Continue to work on above issues and tx goal.. At the next session, the therapist will use Cognitive Behavioral Therapy, Dialectical Behavior Therapy, Mindfulness-based Strategies, Motivational Interviewing and Supportive Psychotherapy to address same. RS'd for biweekly. .    Behavioral Health Treatment Plan and Discharge Planning: Nikki Oropeza is aware of and agrees to continue to work on their treatment plan. They have identified and are working toward their discharge goals.  yes    Visit start and stop times:    6/29/23  Start Time: 1305  Stop Time: 5793  Total Visit Time: 52 minutes    HPI     Past Medical History:   Diagnosis Date   • Strep throat        No past surgical history on file. Current Outpatient Medications   Medication Sig Dispense Refill   • fluticasone (FLONASE) 50 mcg/act nasal spray 1 spray into each nostril daily (Patient not taking: Reported on 11/10/2022) 9.9 mL 0     No current facility-administered medications for this visit. Allergies   Allergen Reactions   • Prednisone Chest Pain       Review of Systems    Video Exam    There were no vitals filed for this visit.     Physical Exam

## 2023-07-14 ENCOUNTER — TELEPHONE (OUTPATIENT)
Dept: BEHAVIORAL/MENTAL HEALTH CLINIC | Facility: CLINIC | Age: 23
End: 2023-07-14

## 2023-07-14 NOTE — TELEPHONE ENCOUNTER
Ct. sent email asking my availability. Called and spoke for 13 min re: getting feelings of guilt for twin sister struggling, care for mother, and Bf being at odds with M b/c of their rx. Grounded client and worked on issues of detachment. Reminded of warm lines support and RX'd for total of 4 f/u appts. Ct. appreciated call. How Severe Are Your Spot(S)?: mild What Is The Reason For Today's Visit?: Full Body Skin Examination What Is The Reason For Today's Visit? (Being Monitored For X): the re-examination of lesions previously examined

## 2023-07-27 ENCOUNTER — TELEMEDICINE (OUTPATIENT)
Dept: BEHAVIORAL/MENTAL HEALTH CLINIC | Facility: CLINIC | Age: 23
End: 2023-07-27
Payer: COMMERCIAL

## 2023-07-27 DIAGNOSIS — F41.9 ANXIETY DISORDER, UNSPECIFIED TYPE: Primary | ICD-10-CM

## 2023-07-27 PROCEDURE — 90834 PSYTX W PT 45 MINUTES: CPT | Performed by: COUNSELOR

## 2023-07-27 NOTE — PSYCH
Virtual Regular Visit    Verification of patient location:    Patient is located in the following state in which I hold an active license [place]      Assessment/Plan:    Problem List Items Addressed This Visit    None      Goals addressed in session: Goal 1          Reason for visit is   No chief complaint on file. Encounter provider Lila Villagran [facility]    Provider located at [address] [facility]  [address] [facility] [address] [place] [address]  [phone]      Recent Visits  No visits were found meeting these conditions. Showing recent visits within past 7 days and meeting all other requirements  Today's Visits  Date Type Provider Dept   07/27/23 [address] [place] [address], [address] [place] [address] [facility]   Showing today's visits and meeting all other requirements  Future Appointments  No visits were found meeting these conditions. Showing future appointments within next 150 days and meeting all other requirements       The patient was identified by name and date of birth. Meghan Kennedy was informed that this is a telemedicine visit and that the visit is being conducted through the [employer]. She agrees to proceed. .  My office door was closed. No one else was in the room. She acknowledged consent and understanding of privacy and security of the video platform. The patient has agreed to participate and understands they can discontinue the visit at any time. Patient is aware this is a billable service. Subjective  Meghan Kennedy is a 25 y.o. female presenting for counseling support. Behavioral Health Psychotherapy Progress Note    Psychotherapy Provided: Individual Psychotherapy     No diagnosis found. Goals addressed in session: Goal 1     DATA:     Demetra Smith continued to discuss ongoing challenges with his mother and her dislike for Demetra Smith. Demetra Smith is tired of fighting against his mother's disdain. Montana Magaña and client discussed how to manage situation. Now, "She is not going to be invited to our wedding, and won't be allowed to be around our children in the future." Ct. Relayed some awful things said and she discussed how can manage situation. "I am not going to be around her if she is going to treat me badly.". Discussed ways to support Montana Magaña and to watch for traps of parent child language. Reviewed choose choose langauage and rec. Reading assertiveness chapter in anxiety and phobia workbook, raheel section on "techniques to avoid manipulation. ". Demetra Smith continues to appreciate tx support. Some tension between client and BF. Demetra Smith. continues to try and work to keep them on the same page and not fall into the traps of splitting. Both are also stressed by work issues in addition to tensions between BF, ct and his mother. BF getting caught in the middle. Demetra Smith noted some worrying, some tension, some sleep challenges (tossing and turning), some irritability. Demetra Smith discussed having Montana Magaña meet her farmily - 1 hour + away, in [facility]. They are excited to meet him and positive about our relationship. During this session, this clinician used the following therapeutic modalities: Cognitive Behavioral Therapy, Dialectical Behavior Therapy, Mindfulness-based Strategies, Motivational Interviewing and Supportive Psychotherapy    Substance Abuse was not addressed during this session. If the client is diagnosed with a co-occurring substance use disorder, please indicate any changes in the frequency or amount of use  Stage of change for addressing substance use diagnoses: No substance use/Not applicable    ASSESSMENT:  Meghan Kennedy presents with a Euthymic/ normal mood and some stress/anxiety. .   her affect is Normal range and intensity, which is congruent, with her mood and the content of the session. The client has made progress on their goals.      Meghan Kennedy presents with a none risk of suicide, none risk of self-harm, and none risk of harm to others. For any risk assessment that surpasses a "low" rating, a safety plan must be developed. A safety plan was indicated: no  If yes, describe in detail     PLAN: Between sessions, Ryley Tapia will continue to work on above issues and tx goal.. At the next session, the therapist will use Cognitive Behavioral Therapy, Dialectical Behavior Therapy, Mindfulness-based Strategies, Motivational Interviewing and Supportive Psychotherapy to address same. RS'd for 8/14, biweekly. .    Behavioral Health Treatment Plan and Discharge Planning: Ryley Tapia is aware of and agrees to continue to work on their treatment plan. They have identified and are working toward their discharge goals. yes    Visit start and stop times:    7/27/23  Start Time: 1306  Stop Time: 1358  Total Visit Time: 52 minutes    HPI     Past Medical History:   Diagnosis Date   • Strep throat        No past surgical history on file. Current Outpatient Medications   Medication Sig Dispense Refill   • fluticasone (FLONASE) 50 mcg/act nasal spray 1 spray into each nostril daily (Patient not taking: Reported on 11/10/2022) 9.9 mL 0     No current facility-administered medications for this visit. Allergies   Allergen Reactions   • Prednisone Chest Pain       Review of Systems    Video Exam    There were no vitals filed for this visit.     Physical Exam

## 2023-09-01 ENCOUNTER — TELEPHONE (OUTPATIENT)
Dept: BEHAVIORAL/MENTAL HEALTH CLINIC | Facility: CLINIC | Age: 23
End: 2023-09-01

## 2023-12-04 ENCOUNTER — HOSPITAL ENCOUNTER (EMERGENCY)
Facility: HOSPITAL | Age: 23
Discharge: HOME | End: 2023-12-04
Attending: EMERGENCY MEDICINE | Admitting: EMERGENCY MEDICINE
Payer: COMMERCIAL

## 2023-12-04 VITALS
RESPIRATION RATE: 16 BRPM | DIASTOLIC BLOOD PRESSURE: 79 MMHG | OXYGEN SATURATION: 99 % | HEART RATE: 70 BPM | TEMPERATURE: 98.2 F | SYSTOLIC BLOOD PRESSURE: 121 MMHG

## 2023-12-04 DIAGNOSIS — R11.2 NAUSEA & VOMITING: ICD-10-CM

## 2023-12-04 DIAGNOSIS — K08.89 PAIN, DENTAL: Primary | ICD-10-CM

## 2023-12-04 LAB
EXT PREGNANCY TEST URINE: NEGATIVE
EXT. CONTROL: NORMAL

## 2023-12-04 PROCEDURE — 99282 EMERGENCY DEPT VISIT SF MDM: CPT

## 2023-12-04 PROCEDURE — 81025 URINE PREGNANCY TEST: CPT | Performed by: EMERGENCY MEDICINE

## 2023-12-04 PROCEDURE — 99284 EMERGENCY DEPT VISIT MOD MDM: CPT | Performed by: EMERGENCY MEDICINE

## 2023-12-04 PROCEDURE — 96372 THER/PROPH/DIAG INJ SC/IM: CPT

## 2023-12-04 RX ORDER — AMOXICILLIN AND CLAVULANATE POTASSIUM 875; 125 MG/1; MG/1
1 TABLET, FILM COATED ORAL ONCE
Status: COMPLETED | OUTPATIENT
Start: 2023-12-04 | End: 2023-12-04

## 2023-12-04 RX ORDER — ONDANSETRON 4 MG/1
4 TABLET, ORALLY DISINTEGRATING ORAL EVERY 6 HOURS PRN
Qty: 20 TABLET | Refills: 0 | Status: SHIPPED | OUTPATIENT
Start: 2023-12-04

## 2023-12-04 RX ORDER — KETOROLAC TROMETHAMINE 30 MG/ML
30 INJECTION, SOLUTION INTRAMUSCULAR; INTRAVENOUS ONCE
Status: COMPLETED | OUTPATIENT
Start: 2023-12-04 | End: 2023-12-04

## 2023-12-04 RX ORDER — ONDANSETRON 4 MG/1
4 TABLET, ORALLY DISINTEGRATING ORAL ONCE
Status: COMPLETED | OUTPATIENT
Start: 2023-12-04 | End: 2023-12-04

## 2023-12-04 RX ORDER — CHLORHEXIDINE GLUCONATE ORAL RINSE 1.2 MG/ML
15 SOLUTION DENTAL 2 TIMES DAILY
Qty: 120 ML | Refills: 0 | Status: SHIPPED | OUTPATIENT
Start: 2023-12-04

## 2023-12-04 RX ORDER — OXYCODONE HYDROCHLORIDE AND ACETAMINOPHEN 5; 325 MG/1; MG/1
1 TABLET ORAL EVERY 6 HOURS PRN
Qty: 10 TABLET | Refills: 0 | Status: SHIPPED | OUTPATIENT
Start: 2023-12-04 | End: 2023-12-14

## 2023-12-04 RX ORDER — AMOXICILLIN AND CLAVULANATE POTASSIUM 875; 125 MG/1; MG/1
1 TABLET, FILM COATED ORAL EVERY 12 HOURS
Qty: 14 TABLET | Refills: 0 | Status: SHIPPED | OUTPATIENT
Start: 2023-12-04 | End: 2023-12-11

## 2023-12-04 RX ADMIN — KETOROLAC TROMETHAMINE 30 MG: 30 INJECTION, SOLUTION INTRAMUSCULAR; INTRAVENOUS at 23:42

## 2023-12-04 RX ADMIN — AMOXICILLIN AND CLAVULANATE POTASSIUM 1 TABLET: 875; 125 TABLET, COATED ORAL at 23:42

## 2023-12-04 RX ADMIN — ONDANSETRON 4 MG: 4 TABLET, ORALLY DISINTEGRATING ORAL at 23:40

## 2023-12-04 NOTE — Clinical Note
Wilder Milan was seen and treated in our emergency department on 12/4/2023. Diagnosis:     Demetra Ott  may return to work on return date. She may return on this date: 12/06/2023         If you have any questions or concerns, please don't hesitate to call.       Normajean Brunner, MD    ______________________________           _______________          _______________  Hospital Representative                              Date                                Time

## 2023-12-05 NOTE — ED PROVIDER NOTES
History  Chief Complaint   Patient presents with    Dental Pain     States wisdom teeth are "re-growing" and having 10/10 pain. 25 yo F presents to ED with dental pain. Noticed a molar poking through this week, pain worse today. Tried motrin/tylenol. Throbbing 10/10 pain. No trauma. No smoking/drugs. Occasional etoh/vaping. No drooling. Tolerating po but decreased appetite due to pain. Tried motrin/tylenol with no relief, and started vomiting this evening. Denies abd pain, diarrhea, or urinary sx. Denies pregnancy. Has not exceeded 4g tylenol in 24 hours. Doesn't currently have a dentist, just got new insurance. History provided by:  Patient and medical records   used: No    Dental Pain  Location:  Lower  Lower teeth location:  30/RL 1st molar  Quality:  Aching  Severity:  Severe  Onset quality:  Gradual  Duration:  1 day  Timing:  Constant  Progression:  Worsening  Chronicity:  New  Context: normal dentition and not recent dental surgery    Relieved by:  Nothing  Worsened by:  Touching  Ineffective treatments:  NSAIDs and acetaminophen  Associated symptoms: no congestion, no difficulty swallowing, no drooling, no facial pain, no facial swelling, no fever, no gum swelling, no headaches, no neck pain, no neck swelling, no oral bleeding, no oral lesions and no trismus    Risk factors: lack of dental care    Risk factors: no smoking        Cannot display prior to admission medications because the patient has not been admitted in this contact. Past Medical History:   Diagnosis Date    Strep throat        History reviewed. No pertinent surgical history. History reviewed. No pertinent family history. I have reviewed and agree with the history as documented.     E-Cigarette/Vaping    E-Cigarette Use Never User      E-Cigarette/Vaping Substances     Social History     Tobacco Use    Smoking status: Passive Smoke Exposure - Never Smoker    Smokeless tobacco: Never   Vaping Use    Vaping Use: Never used   Substance Use Topics    Alcohol use: Never    Drug use: Never       Review of Systems   Constitutional:  Negative for appetite change, chills, fatigue and fever. HENT:  Positive for dental problem. Negative for congestion, drooling, ear pain, facial swelling, mouth sores, rhinorrhea, sore throat, trouble swallowing and voice change. Eyes:  Negative for pain and visual disturbance. Respiratory:  Negative for cough, chest tightness and shortness of breath. Cardiovascular:  Negative for chest pain, palpitations and leg swelling. Gastrointestinal:  Negative for abdominal pain, blood in stool, constipation, diarrhea, nausea and vomiting. Genitourinary:  Negative for difficulty urinating and hematuria. Musculoskeletal:  Negative for back pain, neck pain and neck stiffness. Skin:  Negative for rash. Neurological:  Negative for dizziness, syncope, speech difficulty, light-headedness and headaches. Psychiatric/Behavioral:  Negative for confusion and suicidal ideas. Physical Exam  Physical Exam  Vitals and nursing note reviewed. Constitutional:       General: She is not in acute distress. Appearance: Normal appearance. She is well-developed. She is not ill-appearing, toxic-appearing or diaphoretic. HENT:      Head: Normocephalic and atraumatic. Jaw: There is normal jaw occlusion. No trismus or swelling. Right Ear: External ear normal.      Left Ear: External ear normal.      Nose: Nose normal.      Mouth/Throat:      Dentition: Dental tenderness present. No dental abscesses. Comments: Protruding molar tender, with some erythema of surrounding gums. No abscess. No trismus/ludwigs. No facial swelling or fluctuance. Eyes:      General: No scleral icterus. Right eye: No discharge. Left eye: No discharge. Extraocular Movements: Extraocular movements intact.       Conjunctiva/sclera: Conjunctivae normal.      Pupils: Pupils are equal, round, and reactive to light. Neck:      Trachea: No tracheal deviation. Cardiovascular:      Rate and Rhythm: Normal rate and regular rhythm. Pulses: Normal pulses. Heart sounds: Normal heart sounds. No murmur heard. No friction rub. No gallop. Pulmonary:      Effort: Pulmonary effort is normal. No respiratory distress. Breath sounds: Normal breath sounds. No stridor. Chest:      Chest wall: No tenderness. Abdominal:      General: Bowel sounds are normal.      Palpations: Abdomen is soft. Tenderness: There is no abdominal tenderness. There is no right CVA tenderness, left CVA tenderness, guarding or rebound. Musculoskeletal:         General: No tenderness or deformity. Normal range of motion. Cervical back: Normal range of motion and neck supple. No tenderness. Right lower leg: No edema. Left lower leg: No edema. Lymphadenopathy:      Cervical: No cervical adenopathy. Skin:     General: Skin is warm and dry. Findings: No bruising or rash. Neurological:      General: No focal deficit present. Mental Status: She is alert and oriented to person, place, and time. Cranial Nerves: No cranial nerve deficit. Sensory: No sensory deficit. Motor: No weakness.       Coordination: Coordination normal.   Psychiatric:         Mood and Affect: Mood normal.         Behavior: Behavior normal.         Vital Signs  ED Triage Vitals [12/04/23 2302]   Temperature Pulse Respirations Blood Pressure SpO2   98.2 °F (36.8 °C) 70 16 121/79 99 %      Temp Source Heart Rate Source Patient Position - Orthostatic VS BP Location FiO2 (%)   Temporal Monitor Sitting Left arm --      Pain Score       10 - Worst Possible Pain           Vitals:    12/04/23 2302   BP: 121/79   Pulse: 70   Patient Position - Orthostatic VS: Sitting         Visual Acuity      ED Medications  Medications   ketorolac (TORADOL) injection 30 mg (30 mg Intramuscular Given 12/4/23 7192) ondansetron (ZOFRAN-ODT) dispersible tablet 4 mg (4 mg Oral Given 12/4/23 2340)   amoxicillin-clavulanate (AUGMENTIN) 875-125 mg per tablet 1 tablet (1 tablet Oral Given 12/4/23 2342)       Diagnostic Studies  Results Reviewed       Procedure Component Value Units Date/Time    POCT pregnancy, urine [[id number]]  (Normal) Resulted: 12/04/23 2342    Lab Status: Final result Updated: 12/04/23 2342     EXT Preg Test, Ur Negative     Control Valid                   No orders to display              Procedures  Procedures         ED Course  ED Course as of 12/04/23 2350   Mon Dec 04, 2023   2350 Pt nontoxic. Discussed supportive care, dentist f/u, and RTED precautions. Tolerated po. RTED if worsening. Medical Decision Making  Problems Addressed:  Nausea & vomiting: acute illness or injury  Pain, dental: acute illness or injury    Amount and/or Complexity of Data Reviewed  Labs: ordered. Decision-making details documented in ED Course. Risk  Prescription drug management. Disposition  Final diagnoses:   Pain, dental   Nausea & vomiting     Time reflects when diagnosis was documented in both MDM as applicable and the Disposition within this note       Time User Action Codes Description Comment    12/4/2023 11:40 PM Marky Callahan Add [K08.89] Pain, dental     12/4/2023 11:44 PM Marky Callahan Add [R11.2] Nausea & vomiting           ED Disposition       ED Disposition   Discharge    Condition   Stable    Date/Time   Mon Dec 4, 2023 11:49 PM    [address] discharge to home/self care. Follow-up Information       Follow up With Specialties Details Why Contact Info Additional Information    Annabel Hogan DO  Schedule an appointment as soon as possible for a visit   [address] [place] [address].   [facility] Emergency Department Emergency Medicine  If symptoms worsen [address] [facility] [id number]  [phone] [address] [facility] Emergency Department, [address] [facility] [place], [facility], [address]    [place] [facility] and [address] [facility] [address]  [address]    [address]  [address] [place]   [phone]             Patient's Medications   Discharge Prescriptions    AMOXICILLIN-CLAVULANATE (AUGMENTIN) 875-125 MG PER TABLET    Take 1 tablet by mouth every 12 (twelve) hours for 7 days       Start Date: 12/4/2023 End Date: 12/11/2023       Order Dose: 1 tablet       Quantity: 14 tablet    Refills: 0    CHLORHEXIDINE (PERIDEX) 0.12 % SOLUTION    Apply 15 mL to the mouth or throat 2 (two) times a day       Start Date: 12/4/2023 End Date: --       Order Dose: 15 mL       Quantity: 120 mL    Refills: 0    ONDANSETRON (ZOFRAN-ODT) 4 MG DISINTEGRATING TABLET    Take 1 tablet (4 mg total) by mouth every 6 (six) hours as needed for nausea or vomiting       Start Date: 12/4/2023 End Date: --       Order Dose: 4 mg       Quantity: 20 tablet    Refills: 0    OXYCODONE-ACETAMINOPHEN (PERCOCET) 5-325 MG PER TABLET    Take 1 tablet by mouth every 6 (six) hours as needed for moderate pain for up to 10 days Max Daily Amount: 4 tablets       Start Date: 12/4/2023 End Date: 12/14/2023       Order Dose: 1 tablet       Quantity: 10 tablet    Refills: 0       No discharge procedures on file.     PDMP Review         Value Time User    PDMP Reviewed  Yes 12/4/2023 11:44 PM Aysha Head MD            ED Provider  Electronically Signed by             Aysha Head MD  12/04/23 9272

## 2023-12-05 NOTE — DISCHARGE INSTRUCTIONS
Call your dentist as discussed. If you can't get an appointment, try calling the dental clinics provided.

## 2024-05-08 ENCOUNTER — OFFICE VISIT (OUTPATIENT)
Dept: URGENT CARE | Facility: CLINIC | Age: 24
End: 2024-05-08
Payer: COMMERCIAL

## 2024-05-08 VITALS
TEMPERATURE: 100.4 F | RESPIRATION RATE: 18 BRPM | HEART RATE: 80 BPM | SYSTOLIC BLOOD PRESSURE: 116 MMHG | HEIGHT: 62 IN | OXYGEN SATURATION: 99 % | WEIGHT: 111 LBS | BODY MASS INDEX: 20.43 KG/M2 | DIASTOLIC BLOOD PRESSURE: 62 MMHG

## 2024-05-08 DIAGNOSIS — N39.0 URINARY TRACT INFECTION WITHOUT HEMATURIA, SITE UNSPECIFIED: Primary | ICD-10-CM

## 2024-05-08 LAB
SL AMB  POCT GLUCOSE, UA: NEGATIVE
SL AMB LEUKOCYTE ESTERASE,UA: ABNORMAL
SL AMB POCT BILIRUBIN,UA: NEGATIVE
SL AMB POCT BLOOD,UA: NEGATIVE
SL AMB POCT CLARITY,UA: ABNORMAL
SL AMB POCT COLOR,UA: ABNORMAL
SL AMB POCT KETONES,UA: 15
SL AMB POCT NITRITE,UA: NEGATIVE
SL AMB POCT PH,UA: 6
SL AMB POCT SPECIFIC GRAVITY,UA: 1.02
SL AMB POCT URINE HCG: NEGATIVE
SL AMB POCT URINE PROTEIN: NEGATIVE
SL AMB POCT UROBILINOGEN: 0.2

## 2024-05-08 PROCEDURE — 81025 URINE PREGNANCY TEST: CPT | Performed by: PHYSICIAN ASSISTANT

## 2024-05-08 PROCEDURE — 87086 URINE CULTURE/COLONY COUNT: CPT | Performed by: PHYSICIAN ASSISTANT

## 2024-05-08 PROCEDURE — 81002 URINALYSIS NONAUTO W/O SCOPE: CPT | Performed by: PHYSICIAN ASSISTANT

## 2024-05-08 PROCEDURE — 99213 OFFICE O/P EST LOW 20 MIN: CPT | Performed by: PHYSICIAN ASSISTANT

## 2024-05-08 RX ORDER — CEPHALEXIN 500 MG/1
500 CAPSULE ORAL EVERY 8 HOURS SCHEDULED
Qty: 30 CAPSULE | Refills: 0 | Status: SHIPPED | OUTPATIENT
Start: 2024-05-08 | End: 2024-05-18

## 2024-05-08 RX ORDER — AMOXICILLIN 500 MG/1
CAPSULE ORAL
COMMUNITY
Start: 2024-05-02

## 2024-05-08 NOTE — PROGRESS NOTES
"[facility]        NAME: Dian Rowley is a 23 y.o. female  : 2000    MRN: [id number]  DATE: May 8, 2024  TIME: 5:44 PM    /62   Pulse 80   Temp 100.4 °F (38 °C)   Resp 18   Ht 5' 2\" (1.575 m)   Wt 50.3 kg (111 lb)   LMP 2024 (Approximate)   SpO2 99%   BMI 20.30 kg/m²     Assessment and Plan   Urinary tract infection without hematuria, site unspecified [N39.0]  1. Urinary tract infection without hematuria, site unspecified  POCT urine dip    Urine culture    POCT urine HCG    Urine culture    cephalexin (KEFLEX) 500 mg capsule            Patient Instructions       Follow up with PCP in 3-5 days.  Proceed to  ER if symptoms worsen.    Chief Complaint     Chief Complaint   Patient presents with    Possible UTI     Pt reports 3 days of urinary burning. Denies fevers, N/V or flank pain. Taking amoxicillin - prescribed by dentist due to a broken tooth.         History of Present Illness       Pt with burning with urine for several days pt on amoxil now for dental caries, no dental pain , pt seeing dentist again next week         Review of Systems   Review of Systems   Constitutional: Negative.    HENT: Negative.     Eyes: Negative.    Respiratory: Negative.     Cardiovascular: Negative.    Gastrointestinal: Negative.    Endocrine: Negative.    Genitourinary:  Positive for dysuria.   Musculoskeletal: Negative.    Skin: Negative.    Allergic/Immunologic: Negative.    Neurological: Negative.    Hematological: Negative.    Psychiatric/Behavioral: Negative.     All other systems reviewed and are negative.        Current Medications       Current Outpatient Medications:     amoxicillin (AMOXIL) 500 mg capsule, TAKE 2 CAPSULES BY MOUTH AT ONCE THEN ONE CAPSULE EVERY 8 HOURS UNTIL FINISHED, Disp: , Rfl:     cephalexin (KEFLEX) 500 mg capsule, Take 1 capsule (500 mg total) by mouth every 8 (eight) hours for 10 days, Disp: 30 capsule, Rfl: 0    chlorhexidine (PERIDEX) 0.12 % solution, Apply " "15 mL to the mouth or throat 2 (two) times a day (Patient not taking: Reported on 5/8/2024), Disp: 120 mL, Rfl: 0    fluticasone (FLONASE) 50 mcg/act nasal spray, 1 spray into each nostril daily (Patient not taking: Reported on 11/10/2022), Disp: 9.9 mL, Rfl: 0    ondansetron (ZOFRAN-ODT) 4 mg disintegrating tablet, Take 1 tablet (4 mg total) by mouth every 6 (six) hours as needed for nausea or vomiting (Patient not taking: Reported on 5/8/2024), Disp: 20 tablet, Rfl: 0    Current Allergies     Allergies as of 05/08/2024 - Reviewed 05/08/2024   Allergen Reaction Noted    Prednisone Chest Pain 10/29/2021            The following portions of the patient's history were reviewed and updated as appropriate: allergies, current medications, past family history, past medical history, past social history, past surgical history and problem list.     Past Medical History:   Diagnosis Date    Strep throat        History reviewed. No pertinent surgical history.    History reviewed. No pertinent family history.      Medications have been verified.        Objective   /62   Pulse 80   Temp 100.4 °F (38 °C)   Resp 18   Ht 5' 2\" (1.575 m)   Wt 50.3 kg (111 lb)   LMP 04/20/2024 (Approximate)   SpO2 99%   BMI 20.30 kg/m²        Physical Exam     Physical Exam  Vitals and nursing note reviewed.   Constitutional:       Appearance: Normal appearance. She is normal weight.   HENT:      Head: Normocephalic and atraumatic.      Right Ear: Tympanic membrane, ear canal and external ear normal.      Left Ear: Tympanic membrane, ear canal and external ear normal.   Cardiovascular:      Rate and Rhythm: Normal rate and regular rhythm.      Pulses: Normal pulses.      Heart sounds: Normal heart sounds.   Pulmonary:      Effort: Pulmonary effort is normal.      Breath sounds: Normal breath sounds.   Abdominal:      Palpations: Abdomen is soft.   Musculoskeletal:         General: Normal range of motion.      Cervical back: Normal range " of motion and neck supple.   Skin:     General: Skin is warm.   Neurological:      Mental Status: She is alert and oriented to person, place, and time.

## 2024-05-09 LAB — BACTERIA UR CULT: NORMAL

## 2024-06-07 ENCOUNTER — OFFICE VISIT (OUTPATIENT)
Dept: URGENT CARE | Facility: CLINIC | Age: 24
End: 2024-06-07
Payer: COMMERCIAL

## 2024-06-07 VITALS
SYSTOLIC BLOOD PRESSURE: 110 MMHG | OXYGEN SATURATION: 100 % | BODY MASS INDEX: 20.43 KG/M2 | WEIGHT: 111 LBS | RESPIRATION RATE: 16 BRPM | HEIGHT: 62 IN | HEART RATE: 72 BPM | DIASTOLIC BLOOD PRESSURE: 60 MMHG | TEMPERATURE: 99 F

## 2024-06-07 DIAGNOSIS — J06.9 VIRAL UPPER RESPIRATORY TRACT INFECTION WITH COUGH: Primary | ICD-10-CM

## 2024-06-07 PROCEDURE — 99212 OFFICE O/P EST SF 10 MIN: CPT | Performed by: PHYSICIAN ASSISTANT

## 2024-06-07 RX ORDER — BENZONATATE 200 MG/1
200 CAPSULE ORAL 3 TIMES DAILY PRN
Qty: 20 CAPSULE | Refills: 0 | Status: SHIPPED | OUTPATIENT
Start: 2024-06-07

## 2024-06-07 RX ORDER — BROMPHENIRAMINE MALEATE, PSEUDOEPHEDRINE HYDROCHLORIDE, AND DEXTROMETHORPHAN HYDROBROMIDE 2; 30; 10 MG/5ML; MG/5ML; MG/5ML
10 SYRUP ORAL 4 TIMES DAILY PRN
Qty: 120 ML | Refills: 0 | Status: SHIPPED | OUTPATIENT
Start: 2024-06-07 | End: 2024-06-12

## 2024-06-07 NOTE — PROGRESS NOTES
[facility]        NAME: Dian Rowley is a 23 y.o. female  : 2000    MRN: [id number]  DATE: 2024  TIME: 9:21 AM    Assessment and Plan   Viral upper respiratory tract infection with cough [J06.9]  1. Viral upper respiratory tract infection with cough  brompheniramine-pseudoephedrine-DM 30-2-10 MG/5ML syrup    benzonatate (TESSALON) 200 MG capsule            Patient Instructions     Use Bromfed as directed   Try Tessalon for cough  Follow up in 1 week if cough persists and would consider antibiotic.  Return sooner if fever >101, SOB, CP overall fatigue/malaise develope  Follow up with PCP in 3-5 days.  Proceed to  ER if symptoms worsen.    If tests have been performed at [facility], our office will contact you with results if changes need to be made to the care plan discussed with you at the visit.  You can review your full results on [facility].    Chief Complaint     Chief Complaint   Patient presents with    Cough     Pt reports non-productive cough with onset two weeks ago. C/o chest tightness. Managing symptoms with DayQuil and NyQuil last dose last night without much relief. Denies any known fever.          History of Present Illness           Cough  This is a new problem. Episode onset: 2 weeks. The problem has been unchanged. The cough is Non-productive. Associated symptoms include shortness of breath. Pertinent negatives include no chest pain, chills, ear congestion, ear pain, fever, headaches, nasal congestion, rash, rhinorrhea or sore throat. Treatments tried: allergy meds. The treatment provided no relief.   She tried allergy meds initially without improvement.  She reports mild chest tightness and SOB with coughing spells.    She was on antibiotics in early may for an infected tooth and completed a course of Keflex 24 prescribed for UTI  PMH: non contributory    Review of Systems   Review of Systems   Constitutional:  Negative for chills and fever.   HENT:   Negative for congestion, ear pain, rhinorrhea and sore throat.    Eyes:  Negative for pain and visual disturbance.   Respiratory:  Positive for cough, chest tightness and shortness of breath.    Cardiovascular:  Negative for chest pain and palpitations.   Gastrointestinal:  Negative for abdominal pain and vomiting.   Genitourinary:  Negative for dysuria and hematuria.   Musculoskeletal:  Negative for arthralgias and back pain.   Skin:  Negative for color change and rash.   Neurological:  Negative for seizures, syncope and headaches.   All other systems reviewed and are negative.        Current Medications       Current Outpatient Medications:     benzonatate (TESSALON) 200 MG capsule, Take 1 capsule (200 mg total) by mouth 3 (three) times a day as needed for cough, Disp: 20 capsule, Rfl: 0    brompheniramine-pseudoephedrine-DM 30-2-10 MG/5ML syrup, Take 10 mL by mouth 4 (four) times a day as needed for allergies for up to 5 days, Disp: 120 mL, Rfl: 0    amoxicillin (AMOXIL) 500 mg capsule, TAKE 2 CAPSULES BY MOUTH AT ONCE THEN ONE CAPSULE EVERY 8 HOURS UNTIL FINISHED (Patient not taking: Reported on 6/7/2024), Disp: , Rfl:     chlorhexidine (PERIDEX) 0.12 % solution, Apply 15 mL to the mouth or throat 2 (two) times a day (Patient not taking: Reported on 5/8/2024), Disp: 120 mL, Rfl: 0    fluticasone (FLONASE) 50 mcg/act nasal spray, 1 spray into each nostril daily (Patient not taking: Reported on 11/10/2022), Disp: 9.9 mL, Rfl: 0    ondansetron (ZOFRAN-ODT) 4 mg disintegrating tablet, Take 1 tablet (4 mg total) by mouth every 6 (six) hours as needed for nausea or vomiting (Patient not taking: Reported on 5/8/2024), Disp: 20 tablet, Rfl: 0    Current Allergies     Allergies as of 06/07/2024 - Reviewed 06/07/2024   Allergen Reaction Noted    Prednisone Chest Pain 10/29/2021            The following portions of the patient's history were reviewed and updated as appropriate: allergies, current medications, past family  "history, past medical history, past social history, past surgical history and problem list.     Past Medical History:   Diagnosis Date    Strep throat        No past surgical history on file.    No family history on file.      Medications have been verified.        Objective   /60 (BP Location: Left arm, Patient Position: Sitting)   Pulse 72   Temp 99 °F (37.2 °C)   Resp 16   Ht 5' 2\" (1.575 m)   Wt 50.3 kg (111 lb)   LMP 04/20/2024 (Approximate)   SpO2 100%   BMI 20.30 kg/m²   Patient's last menstrual period was 04/20/2024 (approximate).       Physical Exam     Physical Exam  Vitals and nursing note reviewed.   Constitutional:       General: She is not in acute distress.     Appearance: Normal appearance.   HENT:      Head: Normocephalic and atraumatic.      Right Ear: Tympanic membrane and ear canal normal.      Left Ear: Tympanic membrane and ear canal normal.      Nose: Nose normal.      Mouth/Throat:      Mouth: Mucous membranes are moist.      Pharynx: No posterior oropharyngeal erythema.   Eyes:      Conjunctiva/sclera: Conjunctivae normal.   Cardiovascular:      Rate and Rhythm: Normal rate and regular rhythm.      Pulses: Normal pulses.      Heart sounds: Normal heart sounds.   Pulmonary:      Effort: Pulmonary effort is normal.      Breath sounds: Normal breath sounds. No wheezing, rhonchi or rales.   Lymphadenopathy:      Cervical: No cervical adenopathy.   Skin:     General: Skin is warm and dry.   Neurological:      Mental Status: She is alert and oriented to person, place, and time.   Psychiatric:         Mood and Affect: Mood normal.         Behavior: Behavior normal.                   "

## 2024-06-07 NOTE — PATIENT INSTRUCTIONS
Use Bromfed as directed   Try Tessalon for cough  Follow up in 1 week if cough persists and would consider antibiotic.  Return sooner if fever >101, SOB, CP overall fatigue/malaise develope  Follow up with PCP in 3-5 days.  Proceed to  ER if symptoms worsen.    If tests have been performed at [facility], our office will contact you with results if changes need to be made to the care plan discussed with you at the visit.  You can review your full results on [facility] MyChart.

## 2024-06-17 ENCOUNTER — OFFICE VISIT (OUTPATIENT)
Dept: URGENT CARE | Facility: CLINIC | Age: 24
End: 2024-06-17
Payer: COMMERCIAL

## 2024-06-17 VITALS
HEIGHT: 62 IN | OXYGEN SATURATION: 100 % | TEMPERATURE: 98 F | BODY MASS INDEX: 20.43 KG/M2 | WEIGHT: 111 LBS | RESPIRATION RATE: 16 BRPM | DIASTOLIC BLOOD PRESSURE: 62 MMHG | SYSTOLIC BLOOD PRESSURE: 100 MMHG | HEART RATE: 62 BPM

## 2024-06-17 DIAGNOSIS — B02.9 HERPES ZOSTER WITHOUT COMPLICATION: Primary | ICD-10-CM

## 2024-06-17 PROCEDURE — 99213 OFFICE O/P EST LOW 20 MIN: CPT | Performed by: PHYSICIAN ASSISTANT

## 2024-06-17 RX ORDER — VALACYCLOVIR HYDROCHLORIDE 1 G/1
1000 TABLET, FILM COATED ORAL 3 TIMES DAILY
Qty: 21 TABLET | Refills: 0 | Status: SHIPPED | OUTPATIENT
Start: 2024-06-17 | End: 2024-06-24

## 2024-06-17 NOTE — PROGRESS NOTES
[facility]        NAME: Dian Rowley is a 23 y.o. female  : 2000    MRN: [id number]  DATE: 2024  TIME: 6:57 PM    Assessment and Plan   Herpes zoster without complication [B02.9]  1. Herpes zoster without complication  valACYclovir (VALTREX) 1,000 mg tablet        Grouped erythematous vesicles following dermatomal pattern and sensitive to palpation and dermatomal pattern.  Shingles appear most likely.  Will start on Valtrex.    Patient Instructions       Follow up with PCP in 3-5 days.  Proceed to  ER if symptoms worsen.    If tests have been performed at [facility], our office will contact you with results if changes need to be made to the care plan discussed with you at the visit.  You can review your full results on [facility].    Chief Complaint     Chief Complaint   Patient presents with    Rash     Pt reports skin rash primarily on right arm with onset yesterday with two areas of redness. States progression of symptoms over night. Managing with benadryl. C/o itching following rinsing with cold water. C/o right shoulder pain.          History of Present Illness       Patient presents with a rash starting to spread down the right arm.  Getting red bumps started approximately and have spread down the arm.  Areas are very sensitive to touch.  Has also been having pain starting the right shoulder extending down the arm where the rash is developing for the past few days.  States it feels like a pinched nerve.  Denies fevers, neck pain/stiffness, or the rash itching.  Does not think she had chickenpox as a kid but is not entirely sure    Rash  Pertinent negatives include no fever.       Review of Systems   Review of Systems   Constitutional:  Negative for fever.   Musculoskeletal:  Negative for neck pain and neck stiffness.   Skin:  Positive for rash.         Current Medications       Current Outpatient Medications:     valACYclovir (VALTREX) 1,000 mg tablet, Take 1 tablet  "(1,000 mg total) by mouth 3 (three) times a day for 7 days, Disp: 21 tablet, Rfl: 0    amoxicillin (AMOXIL) 500 mg capsule, TAKE 2 CAPSULES BY MOUTH AT ONCE THEN ONE CAPSULE EVERY 8 HOURS UNTIL FINISHED (Patient not taking: Reported on 6/7/2024), Disp: , Rfl:     benzonatate (TESSALON) 200 MG capsule, Take 1 capsule (200 mg total) by mouth 3 (three) times a day as needed for cough (Patient not taking: Reported on 6/17/2024), Disp: 20 capsule, Rfl: 0    chlorhexidine (PERIDEX) 0.12 % solution, Apply 15 mL to the mouth or throat 2 (two) times a day (Patient not taking: Reported on 5/8/2024), Disp: 120 mL, Rfl: 0    fluticasone (FLONASE) 50 mcg/act nasal spray, 1 spray into each nostril daily (Patient not taking: Reported on 11/10/2022), Disp: 9.9 mL, Rfl: 0    ondansetron (ZOFRAN-ODT) 4 mg disintegrating tablet, Take 1 tablet (4 mg total) by mouth every 6 (six) hours as needed for nausea or vomiting (Patient not taking: Reported on 5/8/2024), Disp: 20 tablet, Rfl: 0    Current Allergies     Allergies as of 06/17/2024 - Reviewed 06/17/2024   Allergen Reaction Noted    Prednisone Chest Pain 10/29/2021            The following portions of the patient's history were reviewed and updated as appropriate: allergies, current medications, past family history, past medical history, past social history, past surgical history and problem list.     Past Medical History:   Diagnosis Date    Strep throat        No past surgical history on file.    No family history on file.      Medications have been verified.        Objective   /62   Pulse 62   Temp 98 °F (36.7 °C)   Resp 16   Ht 5' 2\" (1.575 m)   Wt 50.3 kg (111 lb)   SpO2 100%   BMI 20.30 kg/m²   No LMP recorded.       Physical Exam     Physical Exam  Constitutional:       Appearance: Normal appearance.   Musculoskeletal:      Comments: Patient TTP following dermatome from right shoulder down right arm over where rashes   Skin:     Findings: Rash present.      " Comments: Grouped erythematous vesicles scattered in a dermatomal pattern down the right arm.   Neurological:      Mental Status: She is alert.   Psychiatric:         Mood and Affect: Mood normal.         Behavior: Behavior normal.

## 2024-06-18 ENCOUNTER — HOSPITAL ENCOUNTER (EMERGENCY)
Facility: HOSPITAL | Age: 24
Discharge: HOME | End: 2024-06-18
Attending: EMERGENCY MEDICINE | Admitting: EMERGENCY MEDICINE
Payer: COMMERCIAL

## 2024-06-18 VITALS
HEART RATE: 68 BPM | SYSTOLIC BLOOD PRESSURE: 177 MMHG | RESPIRATION RATE: 18 BRPM | OXYGEN SATURATION: 97 % | DIASTOLIC BLOOD PRESSURE: 81 MMHG | TEMPERATURE: 97.8 F

## 2024-06-18 DIAGNOSIS — B02.9 HERPES ZOSTER: Primary | ICD-10-CM

## 2024-06-18 PROCEDURE — 99283 EMERGENCY DEPT VISIT LOW MDM: CPT

## 2024-06-18 PROCEDURE — 99284 EMERGENCY DEPT VISIT MOD MDM: CPT | Performed by: EMERGENCY MEDICINE

## 2024-06-18 RX ORDER — GABAPENTIN 300 MG/1
300 CAPSULE ORAL 3 TIMES DAILY
Qty: 30 CAPSULE | Refills: 0 | Status: SHIPPED | OUTPATIENT
Start: 2024-06-18

## 2024-06-18 NOTE — Clinical Note
Linette Parra accompanied Dian Rowley to the emergency department on 6/18/2024.    Return date if applicable: 06/19/2024        If you have any questions or concerns, please don't hesitate to call.      Ari Villalobos, DO

## 2024-06-18 NOTE — DISCHARGE INSTRUCTIONS
Take the valacyclovir and gabapentin as prescribed.  You may also take Tylenol and or ibuprofen if needed.    Discuss your diagnosis with your PCP.  You may need another prescription for more gabapentin after you finish today's prescription.  If so, this should be prescribed by your PCP.  They should follow up on your rash in approximately 1 week as well.    You are contagious until the rash totally dries up and crusted over.

## 2024-06-18 NOTE — Clinical Note
Dian Rowley was seen and treated in our emergency department on 6/18/2024.        No work until cleared by Family Doctor/Orthopedics        Diagnosis:     Dian  .    She may return on this date:          If you have any questions or concerns, please don't hesitate to call.      Ari Villalobos, DO    ______________________________           _______________          _______________  Hospital Representative                              Date                                Time

## 2024-06-18 NOTE — ED PROVIDER NOTES
History  Chief Complaint   Patient presents with    Shoulder Pain     Pt c/o right shoulder pain. Pt reports went to urgent care and doctor suggested it was shingles.     Healthy 23-year-old female complains of rash in the right upper extremity that started 2 days ago.  She has had some right shoulder pain for several days.  Since this morning she has had severe right shoulder and arm pain.  She went to urgent care last night and was prescribed valacyclovir for zoster.  She took 1 dose last night and did not go to the pharmacy to  additional medications.  She complains of more pain but denies fever, dyspnea, URI symptoms.        Prior to Admission Medications   Prescriptions Last Dose Informant Patient Reported? Taking?   amoxicillin (AMOXIL) 500 mg capsule   Yes No   Sig: TAKE 2 CAPSULES BY MOUTH AT ONCE THEN ONE CAPSULE EVERY 8 HOURS UNTIL FINISHED   Patient not taking: Reported on 2024   benzonatate (TESSALON) 200 MG capsule   No No   Sig: Take 1 capsule (200 mg total) by mouth 3 (three) times a day as needed for cough   Patient not taking: Reported on 2024   chlorhexidine (PERIDEX) 0.12 % solution   No No   Sig: Apply 15 mL to the mouth or throat 2 (two) times a day   Patient not taking: Reported on 2024   fluticasone (FLONASE) 50 mcg/act nasal spray   No No   Si spray into each nostril daily   Patient not taking: Reported on 11/10/2022   ondansetron (ZOFRAN-ODT) 4 mg disintegrating tablet   No No   Sig: Take 1 tablet (4 mg total) by mouth every 6 (six) hours as needed for nausea or vomiting   Patient not taking: Reported on 2024   valACYclovir (VALTREX) 1,000 mg tablet   No No   Sig: Take 1 tablet (1,000 mg total) by mouth 3 (three) times a day for 7 days      Facility-Administered Medications: None       Past Medical History:   Diagnosis Date    Strep throat        History reviewed. No pertinent surgical history.    History reviewed. No pertinent family history.  I have reviewed and  agree with the history as documented.    E-Cigarette/Vaping    E-Cigarette Use Never User      E-Cigarette/Vaping Substances     Social History     Tobacco Use    Smoking status: Some Days     Passive exposure: Yes    Smokeless tobacco: Never   Vaping Use    Vaping status: Never Used   Substance Use Topics    Alcohol use: Never    Drug use: Never       Review of Systems   Constitutional:  Negative for fever.   HENT:  Negative for congestion.    Respiratory:  Positive for cough. Negative for shortness of breath.    Cardiovascular:  Negative for chest pain.   Gastrointestinal:  Negative for abdominal pain, diarrhea and nausea.   Neurological:  Negative for dizziness and headaches.       Physical Exam  Physical Exam  Vitals and nursing note reviewed.   Constitutional:       General: She is not in acute distress.     Appearance: She is well-developed and normal weight. She is not ill-appearing or diaphoretic.   HENT:      Head: Normocephalic and atraumatic.      Right Ear: External ear normal.      Left Ear: External ear normal.      Nose: Nose normal.      Mouth/Throat:      Mouth: Mucous membranes are moist.      Pharynx: Oropharynx is clear.   Eyes:      General: No scleral icterus.     Conjunctiva/sclera: Conjunctivae normal.      Pupils: Pupils are equal, round, and reactive to light.   Cardiovascular:      Rate and Rhythm: Normal rate and regular rhythm.      Pulses: Normal pulses.      Heart sounds: Normal heart sounds. No murmur heard.  Pulmonary:      Effort: Pulmonary effort is normal.      Breath sounds: Normal breath sounds.   Abdominal:      General: Bowel sounds are normal.      Palpations: Abdomen is soft.      Tenderness: There is no abdominal tenderness.   Musculoskeletal:         General: Normal range of motion.      Cervical back: Normal range of motion and neck supple. No rigidity or tenderness.   Skin:     General: Skin is warm and dry.      Findings: Rash (Scattered very early vesicles along the  dorsal aspect of the right upper extremity.  Some ulnar grouped together.  No other signs of infection.  No drainage.) present.   Neurological:      General: No focal deficit present.      Mental Status: She is alert and oriented to person, place, and time. Mental status is at baseline.      Cranial Nerves: No cranial nerve deficit.      Coordination: Coordination normal.      Deep Tendon Reflexes: Reflexes are normal and symmetric.   Psychiatric:         Mood and Affect: Mood normal.         Behavior: Behavior normal.         Vital Signs  ED Triage Vitals   Temperature Pulse Respirations Blood Pressure SpO2   06/18/24 0800 06/18/24 0758 06/18/24 0758 06/18/24 0758 06/18/24 0758   97.8 °F (36.6 °C) 99 20 (!) 177/81 99 %      Temp Source Heart Rate Source Patient Position - Orthostatic VS BP Location FiO2 (%)   06/18/24 0800 06/18/24 0758 06/18/24 0758 06/18/24 0758 --   Temporal Monitor Sitting Left arm       Pain Score       06/18/24 0758       10 - Worst Possible Pain           Vitals:    06/18/24 0758 06/18/24 0830   BP: (!) 177/81    Pulse: 99 68   Patient Position - Orthostatic VS: Sitting          Visual Acuity      ED Medications  Medications - No data to display    Diagnostic Studies  Results Reviewed       None                   No orders to display              Procedures  Procedures         ED Course                               SBIRT 22yo+      Flowsheet Row Most Recent Value   Initial Alcohol Screen: US AUDIT-C     1. How often do you have a drink containing alcohol? 1 Filed at: 06/18/2024 0806   2. How many drinks containing alcohol do you have on a typical day you are drinking?  1 Filed at: 06/18/2024 0806   3b. FEMALE Any Age, or MALE 65+: How often do you have 4 or more drinks on one occassion? 0 Filed at: 06/18/2024 0806   Audit-C Score 2 Filed at: 06/18/2024 0806   SOCO: How many times in the past year have you...    Used an illegal drug or used a prescription medication for non-medical reasons?  Never Filed at: 06/18/2024 0806                      Medical Decision Making  23-year-old female with pain and rash of right upper extremity.  Rash is vesicular and follows a dermatome.  This appears to be herpes zoster.  She was prescribed valacyclovir last night but came in more pain and asking for second opinion today.  No other signs of infection.  Not immune compromised.    Amount and/or Complexity of Data Reviewed  Independent Historian: friend  External Data Reviewed: notes.             Disposition  Final diagnoses:   Herpes zoster     Time reflects when diagnosis was documented in both MDM as applicable and the Disposition within this note       Time User Action Codes Description Comment    6/18/2024  8:17 AM Ari Villalobos Add [B02.9] Herpes zoster           ED Disposition       ED Disposition   Discharge    Condition   Stable    Date/Time   Tue Jun 18, 2024 0817    Comment   Dian Rowley discharge to home/self care.                   Follow-up Information       Follow up With Specialties Details Why Contact Maria Hogan DO  Go in 1 week As needed [address] [place] [address]  [address] [place] [id number]  [phone]              Patient's Medications   Discharge Prescriptions    GABAPENTIN (NEURONTIN) 300 MG CAPSULE    Take 1 capsule (300 mg total) by mouth 3 (three) times a day For post-herpetic neuralgia: Take 1 tablet on day 1,  Then take 2 tablets on day 2, Then take 3 tablets on day 3 and every day after that as instructed by your doctor.       Start Date: 6/18/2024 End Date: --       Order Dose: 300 mg       Quantity: 30 capsule    Refills: 0       No discharge procedures on file.    PDMP Review         Value Time User    PDMP Reviewed  Yes 12/4/2023 11:44 PM Trice Katz MD            ED Provider  Electronically Signed by             Ari Villalobos DO  06/18/24 0835

## 2025-05-09 ENCOUNTER — TELEPHONE (OUTPATIENT)
Age: 25
End: 2025-05-09

## 2025-05-09 NOTE — TELEPHONE ENCOUNTER
Contacted patient off of [facility]  wait list to verify needs of services in attempts to update list with patient preferences. LVM for patient to contact intake dept  in regards to wait list maintenance.     1st attempt  Upon call back, verify needs of services, information, and preferences

## 2025-08-21 ENCOUNTER — NURSE TRIAGE (OUTPATIENT)
Dept: OTHER | Facility: OTHER | Age: 25
End: 2025-08-21

## 2025-08-22 ENCOUNTER — OFFICE VISIT (OUTPATIENT)
Age: 25
End: 2025-08-22
Payer: COMMERCIAL

## 2025-08-22 VITALS
OXYGEN SATURATION: 100 % | WEIGHT: 100.9 LBS | SYSTOLIC BLOOD PRESSURE: 92 MMHG | BODY MASS INDEX: 18.57 KG/M2 | DIASTOLIC BLOOD PRESSURE: 72 MMHG | HEIGHT: 62 IN | HEART RATE: 82 BPM | TEMPERATURE: 98.2 F

## 2025-08-22 DIAGNOSIS — R49.9 HOARSENESS OR CHANGING VOICE: Primary | ICD-10-CM

## 2025-08-22 PROCEDURE — 99213 OFFICE O/P EST LOW 20 MIN: CPT | Performed by: INTERNAL MEDICINE
